# Patient Record
Sex: MALE | Race: WHITE | Employment: FULL TIME | URBAN - METROPOLITAN AREA
[De-identification: names, ages, dates, MRNs, and addresses within clinical notes are randomized per-mention and may not be internally consistent; named-entity substitution may affect disease eponyms.]

---

## 2021-10-21 ENCOUNTER — HOSPITAL ENCOUNTER (EMERGENCY)
Facility: CLINIC | Age: 56
Discharge: ANOTHER HOSPITAL | End: 2021-10-22
Attending: EMERGENCY MEDICINE | Admitting: EMERGENCY MEDICINE
Payer: COMMERCIAL

## 2021-10-21 ENCOUNTER — APPOINTMENT (OUTPATIENT)
Dept: CT IMAGING | Facility: CLINIC | Age: 56
End: 2021-10-21
Attending: EMERGENCY MEDICINE
Payer: COMMERCIAL

## 2021-10-21 VITALS
RESPIRATION RATE: 18 BRPM | OXYGEN SATURATION: 95 % | WEIGHT: 181.4 LBS | TEMPERATURE: 98.8 F | HEART RATE: 70 BPM | HEIGHT: 71 IN | SYSTOLIC BLOOD PRESSURE: 135 MMHG | BODY MASS INDEX: 25.4 KG/M2 | DIASTOLIC BLOOD PRESSURE: 77 MMHG

## 2021-10-21 DIAGNOSIS — I60.9 SAH (SUBARACHNOID HEMORRHAGE) (H): ICD-10-CM

## 2021-10-21 LAB
ANION GAP SERPL CALCULATED.3IONS-SCNC: 3 MMOL/L (ref 3–14)
BASOPHILS # BLD AUTO: 0 10E3/UL (ref 0–0.2)
BASOPHILS NFR BLD AUTO: 0 %
BUN SERPL-MCNC: 13 MG/DL (ref 7–30)
CALCIUM SERPL-MCNC: 9 MG/DL (ref 8.5–10.1)
CHLORIDE BLD-SCNC: 102 MMOL/L (ref 94–109)
CO2 SERPL-SCNC: 31 MMOL/L (ref 20–32)
CREAT BLD-MCNC: 1.2 MG/DL (ref 0.7–1.3)
CREAT SERPL-MCNC: 1.17 MG/DL (ref 0.66–1.25)
EOSINOPHIL # BLD AUTO: 0.1 10E3/UL (ref 0–0.7)
EOSINOPHIL NFR BLD AUTO: 1 %
ERYTHROCYTE [DISTWIDTH] IN BLOOD BY AUTOMATED COUNT: 12.5 % (ref 10–15)
GFR SERPL CREATININE-BSD FRML MDRD: 68 ML/MIN/1.73M2
GFR SERPL CREATININE-BSD FRML MDRD: >60 ML/MIN/1.73M2
GLUCOSE BLD-MCNC: 123 MG/DL (ref 70–99)
HCT VFR BLD AUTO: 42.2 % (ref 40–53)
HGB BLD-MCNC: 14.3 G/DL (ref 13.3–17.7)
HOLD SPECIMEN: NORMAL
HOLD SPECIMEN: NORMAL
IMM GRANULOCYTES # BLD: 0 10E3/UL
IMM GRANULOCYTES NFR BLD: 0 %
LYMPHOCYTES # BLD AUTO: 1.8 10E3/UL (ref 0.8–5.3)
LYMPHOCYTES NFR BLD AUTO: 20 %
MCH RBC QN AUTO: 31 PG (ref 26.5–33)
MCHC RBC AUTO-ENTMCNC: 33.9 G/DL (ref 31.5–36.5)
MCV RBC AUTO: 92 FL (ref 78–100)
MONOCYTES # BLD AUTO: 1.1 10E3/UL (ref 0–1.3)
MONOCYTES NFR BLD AUTO: 12 %
NEUTROPHILS # BLD AUTO: 6.1 10E3/UL (ref 1.6–8.3)
NEUTROPHILS NFR BLD AUTO: 67 %
NRBC # BLD AUTO: 0 10E3/UL
NRBC BLD AUTO-RTO: 0 /100
PLATELET # BLD AUTO: 295 10E3/UL (ref 150–450)
POTASSIUM BLD-SCNC: 4 MMOL/L (ref 3.4–5.3)
RADIOLOGIST FLAGS: ABNORMAL
RADIOLOGIST FLAGS: ABNORMAL
RBC # BLD AUTO: 4.61 10E6/UL (ref 4.4–5.9)
SARS-COV-2 RNA RESP QL NAA+PROBE: NEGATIVE
SODIUM SERPL-SCNC: 136 MMOL/L (ref 133–144)
WBC # BLD AUTO: 9.1 10E3/UL (ref 4–11)

## 2021-10-21 PROCEDURE — 96374 THER/PROPH/DIAG INJ IV PUSH: CPT | Mod: 59

## 2021-10-21 PROCEDURE — 99207 PR SERVICE NOT STAFFED W/SUPERV PROV: CPT | Performed by: STUDENT IN AN ORGANIZED HEALTH CARE EDUCATION/TRAINING PROGRAM

## 2021-10-21 PROCEDURE — 85025 COMPLETE CBC W/AUTO DIFF WBC: CPT | Performed by: EMERGENCY MEDICINE

## 2021-10-21 PROCEDURE — 82565 ASSAY OF CREATININE: CPT

## 2021-10-21 PROCEDURE — 250N000009 HC RX 250: Performed by: EMERGENCY MEDICINE

## 2021-10-21 PROCEDURE — 99285 EMERGENCY DEPT VISIT HI MDM: CPT | Mod: 25

## 2021-10-21 PROCEDURE — 70498 CT ANGIOGRAPHY NECK: CPT

## 2021-10-21 PROCEDURE — 250N000011 HC RX IP 250 OP 636: Performed by: EMERGENCY MEDICINE

## 2021-10-21 PROCEDURE — 36415 COLL VENOUS BLD VENIPUNCTURE: CPT | Performed by: EMERGENCY MEDICINE

## 2021-10-21 PROCEDURE — 80048 BASIC METABOLIC PNL TOTAL CA: CPT | Performed by: EMERGENCY MEDICINE

## 2021-10-21 PROCEDURE — 70450 CT HEAD/BRAIN W/O DYE: CPT | Mod: XS

## 2021-10-21 PROCEDURE — 250N000013 HC RX MED GY IP 250 OP 250 PS 637: Performed by: STUDENT IN AN ORGANIZED HEALTH CARE EDUCATION/TRAINING PROGRAM

## 2021-10-21 PROCEDURE — 87635 SARS-COV-2 COVID-19 AMP PRB: CPT | Performed by: EMERGENCY MEDICINE

## 2021-10-21 PROCEDURE — C9803 HOPD COVID-19 SPEC COLLECT: HCPCS

## 2021-10-21 RX ORDER — LABETALOL HYDROCHLORIDE 5 MG/ML
20 INJECTION, SOLUTION INTRAVENOUS ONCE
Status: COMPLETED | OUTPATIENT
Start: 2021-10-21 | End: 2021-10-21

## 2021-10-21 RX ORDER — FLUTICASONE PROPIONATE 50 MCG
1 SPRAY, SUSPENSION (ML) NASAL 2 TIMES DAILY
COMMUNITY

## 2021-10-21 RX ORDER — NIMODIPINE 30 MG/1
30 CAPSULE, LIQUID FILLED ORAL
Status: DISCONTINUED | OUTPATIENT
Start: 2021-10-21 | End: 2021-10-22 | Stop reason: HOSPADM

## 2021-10-21 RX ORDER — IOPAMIDOL 755 MG/ML
70 INJECTION, SOLUTION INTRAVASCULAR ONCE
Status: COMPLETED | OUTPATIENT
Start: 2021-10-21 | End: 2021-10-21

## 2021-10-21 RX ADMIN — LABETALOL HYDROCHLORIDE 20 MG: 5 INJECTION, SOLUTION INTRAVENOUS at 22:58

## 2021-10-21 RX ADMIN — IOPAMIDOL 70 ML: 755 INJECTION, SOLUTION INTRAVENOUS at 21:41

## 2021-10-21 RX ADMIN — NIMODIPINE 30 MG: 30 CAPSULE, LIQUID FILLED ORAL at 22:57

## 2021-10-21 RX ADMIN — SODIUM CHLORIDE 90 ML: 900 INJECTION INTRAVENOUS at 21:41

## 2021-10-21 ASSESSMENT — MIFFLIN-ST. JEOR: SCORE: 1664.96

## 2021-10-21 ASSESSMENT — ENCOUNTER SYMPTOMS
SHORTNESS OF BREATH: 0
FEVER: 0
NECK PAIN: 1
NUMBNESS: 0
HEADACHES: 1
WEAKNESS: 0
ABDOMINAL PAIN: 0

## 2021-10-22 ENCOUNTER — HOSPITAL ENCOUNTER (INPATIENT)
Facility: CLINIC | Age: 56
LOS: 4 days | Discharge: HOME | DRG: 065 | End: 2021-10-26
Attending: NEUROLOGICAL SURGERY | Admitting: NEUROLOGICAL SURGERY
Payer: COMMERCIAL

## 2021-10-22 ENCOUNTER — APPOINTMENT (OUTPATIENT)
Dept: MRI IMAGING | Facility: CLINIC | Age: 56
DRG: 065 | End: 2021-10-22
Attending: STUDENT IN AN ORGANIZED HEALTH CARE EDUCATION/TRAINING PROGRAM
Payer: COMMERCIAL

## 2021-10-22 ENCOUNTER — APPOINTMENT (OUTPATIENT)
Dept: ULTRASOUND IMAGING | Facility: CLINIC | Age: 56
DRG: 065 | End: 2021-10-22
Attending: STUDENT IN AN ORGANIZED HEALTH CARE EDUCATION/TRAINING PROGRAM
Payer: COMMERCIAL

## 2021-10-22 ENCOUNTER — APPOINTMENT (OUTPATIENT)
Dept: CARDIOLOGY | Facility: CLINIC | Age: 56
DRG: 065 | End: 2021-10-22
Attending: STUDENT IN AN ORGANIZED HEALTH CARE EDUCATION/TRAINING PROGRAM
Payer: COMMERCIAL

## 2021-10-22 ENCOUNTER — APPOINTMENT (OUTPATIENT)
Dept: INTERVENTIONAL RADIOLOGY/VASCULAR | Facility: CLINIC | Age: 56
DRG: 065 | End: 2021-10-22
Attending: NEUROLOGICAL SURGERY
Payer: COMMERCIAL

## 2021-10-22 DIAGNOSIS — I60.9 SAH (SUBARACHNOID HEMORRHAGE) (H): Primary | ICD-10-CM

## 2021-10-22 DIAGNOSIS — G25.81 RESTLESS LEGS SYNDROME (RLS): ICD-10-CM

## 2021-10-22 LAB
ANION GAP SERPL CALCULATED.3IONS-SCNC: 4 MMOL/L (ref 3–14)
ATRIAL RATE - MUSE: 69 BPM
BUN SERPL-MCNC: 12 MG/DL (ref 7–30)
CALCIUM SERPL-MCNC: 8.8 MG/DL (ref 8.5–10.1)
CHLORIDE BLD-SCNC: 104 MMOL/L (ref 94–109)
CO2 SERPL-SCNC: 31 MMOL/L (ref 20–32)
CREAT SERPL-MCNC: 1.23 MG/DL (ref 0.66–1.25)
DIASTOLIC BLOOD PRESSURE - MUSE: NORMAL MMHG
ERYTHROCYTE [DISTWIDTH] IN BLOOD BY AUTOMATED COUNT: 12.6 % (ref 10–15)
FERRITIN SERPL-MCNC: 40 NG/ML (ref 26–388)
GFR SERPL CREATININE-BSD FRML MDRD: 64 ML/MIN/1.73M2
GLUCOSE BLD-MCNC: 121 MG/DL (ref 70–99)
GLUCOSE BLDC GLUCOMTR-MCNC: 110 MG/DL (ref 70–99)
GLUCOSE BLDC GLUCOMTR-MCNC: 133 MG/DL (ref 70–99)
HCT VFR BLD AUTO: 39.6 % (ref 40–53)
HGB BLD-MCNC: 13.5 G/DL (ref 13.3–17.7)
INTERPRETATION ECG - MUSE: NORMAL
LVEF ECHO: NORMAL
MAGNESIUM SERPL-MCNC: 2.1 MG/DL (ref 1.6–2.3)
MCH RBC QN AUTO: 31.7 PG (ref 26.5–33)
MCHC RBC AUTO-ENTMCNC: 34.1 G/DL (ref 31.5–36.5)
MCV RBC AUTO: 93 FL (ref 78–100)
MRSA DNA SPEC QL NAA+PROBE: NEGATIVE
P AXIS - MUSE: 24 DEGREES
PHOSPHATE SERPL-MCNC: 2.9 MG/DL (ref 2.5–4.5)
PLATELET # BLD AUTO: 269 10E3/UL (ref 150–450)
POTASSIUM BLD-SCNC: 4.6 MMOL/L (ref 3.4–5.3)
PR INTERVAL - MUSE: 124 MS
QRS DURATION - MUSE: 84 MS
QT - MUSE: 378 MS
QTC - MUSE: 405 MS
R AXIS - MUSE: 66 DEGREES
RBC # BLD AUTO: 4.26 10E6/UL (ref 4.4–5.9)
SA TARGET DNA: POSITIVE
SODIUM SERPL-SCNC: 139 MMOL/L (ref 133–144)
SYSTOLIC BLOOD PRESSURE - MUSE: NORMAL MMHG
T AXIS - MUSE: 35 DEGREES
TROPONIN I SERPL-MCNC: <0.015 UG/L (ref 0–0.04)
VENTRICULAR RATE- MUSE: 69 BPM
WBC # BLD AUTO: 11.5 10E3/UL (ref 4–11)

## 2021-10-22 PROCEDURE — 250N000009 HC RX 250: Performed by: STUDENT IN AN ORGANIZED HEALTH CARE EDUCATION/TRAINING PROGRAM

## 2021-10-22 PROCEDURE — 255N000002 HC RX 255 OP 636: Performed by: NEUROLOGICAL SURGERY

## 2021-10-22 PROCEDURE — A9585 GADOBUTROL INJECTION: HCPCS | Performed by: PSYCHIATRY & NEUROLOGY

## 2021-10-22 PROCEDURE — 36224 PLACE CATH CAROTD ART: CPT | Mod: 50

## 2021-10-22 PROCEDURE — B41FZZZ FLUOROSCOPY OF RIGHT LOWER EXTREMITY ARTERIES: ICD-10-PCS | Performed by: RADIOLOGY

## 2021-10-22 PROCEDURE — 83735 ASSAY OF MAGNESIUM: CPT | Performed by: STUDENT IN AN ORGANIZED HEALTH CARE EDUCATION/TRAINING PROGRAM

## 2021-10-22 PROCEDURE — B31GZZZ FLUOROSCOPY OF BILATERAL VERTEBRAL ARTERIES: ICD-10-PCS | Performed by: RADIOLOGY

## 2021-10-22 PROCEDURE — 93010 ELECTROCARDIOGRAM REPORT: CPT | Performed by: INTERNAL MEDICINE

## 2021-10-22 PROCEDURE — 36226 PLACE CATH VERTEBRAL ART: CPT | Mod: 50

## 2021-10-22 PROCEDURE — 258N000003 HC RX IP 258 OP 636: Performed by: STUDENT IN AN ORGANIZED HEALTH CARE EDUCATION/TRAINING PROGRAM

## 2021-10-22 PROCEDURE — 93886 INTRACRANIAL COMPLETE STUDY: CPT

## 2021-10-22 PROCEDURE — 70553 MRI BRAIN STEM W/O & W/DYE: CPT

## 2021-10-22 PROCEDURE — 72156 MRI NECK SPINE W/O & W/DYE: CPT

## 2021-10-22 PROCEDURE — 272N000506 HC NEEDLE CR6

## 2021-10-22 PROCEDURE — 250N000013 HC RX MED GY IP 250 OP 250 PS 637: Performed by: NURSE PRACTITIONER

## 2021-10-22 PROCEDURE — C1887 CATHETER, GUIDING: HCPCS

## 2021-10-22 PROCEDURE — 250N000013 HC RX MED GY IP 250 OP 250 PS 637: Performed by: STUDENT IN AN ORGANIZED HEALTH CARE EDUCATION/TRAINING PROGRAM

## 2021-10-22 PROCEDURE — 70553 MRI BRAIN STEM W/O & W/DYE: CPT | Mod: 26 | Performed by: RADIOLOGY

## 2021-10-22 PROCEDURE — 255N000002 HC RX 255 OP 636: Performed by: PSYCHIATRY & NEUROLOGY

## 2021-10-22 PROCEDURE — 272N000280 HC DEVICE COMPRESSION CR5

## 2021-10-22 PROCEDURE — 250N000011 HC RX IP 250 OP 636: Performed by: STUDENT IN AN ORGANIZED HEALTH CARE EDUCATION/TRAINING PROGRAM

## 2021-10-22 PROCEDURE — 93306 TTE W/DOPPLER COMPLETE: CPT

## 2021-10-22 PROCEDURE — 87641 MR-STAPH DNA AMP PROBE: CPT | Performed by: STUDENT IN AN ORGANIZED HEALTH CARE EDUCATION/TRAINING PROGRAM

## 2021-10-22 PROCEDURE — 272N000566 HC SHEATH CR3

## 2021-10-22 PROCEDURE — B31RZZZ FLUOROSCOPY OF INTRACRANIAL ARTERIES: ICD-10-PCS | Performed by: RADIOLOGY

## 2021-10-22 PROCEDURE — 999N000128 HC STATISTIC PERIPHERAL IV START W/O US GUIDANCE

## 2021-10-22 PROCEDURE — 36224 PLACE CATH CAROTD ART: CPT | Mod: XS | Performed by: RADIOLOGY

## 2021-10-22 PROCEDURE — 200N000002 HC R&B ICU UMMC

## 2021-10-22 PROCEDURE — 99152 MOD SED SAME PHYS/QHP 5/>YRS: CPT

## 2021-10-22 PROCEDURE — 85027 COMPLETE CBC AUTOMATED: CPT | Performed by: STUDENT IN AN ORGANIZED HEALTH CARE EDUCATION/TRAINING PROGRAM

## 2021-10-22 PROCEDURE — 99291 CRITICAL CARE FIRST HOUR: CPT | Performed by: PSYCHIATRY & NEUROLOGY

## 2021-10-22 PROCEDURE — 93306 TTE W/DOPPLER COMPLETE: CPT | Mod: 26 | Performed by: INTERNAL MEDICINE

## 2021-10-22 PROCEDURE — B318ZZZ FLUOROSCOPY OF BILATERAL INTERNAL CAROTID ARTERIES: ICD-10-PCS | Performed by: RADIOLOGY

## 2021-10-22 PROCEDURE — C1760 CLOSURE DEV, VASC: HCPCS

## 2021-10-22 PROCEDURE — 999N000226 HC STATISTIC SLP IP EVAL DEFER

## 2021-10-22 PROCEDURE — 272N000302 HC DEVICE INFLATION CR5

## 2021-10-22 PROCEDURE — C1769 GUIDE WIRE: HCPCS

## 2021-10-22 PROCEDURE — 36226 PLACE CATH VERTEBRAL ART: CPT | Mod: RT | Performed by: RADIOLOGY

## 2021-10-22 PROCEDURE — 250N000011 HC RX IP 250 OP 636

## 2021-10-22 PROCEDURE — 93886 INTRACRANIAL COMPLETE STUDY: CPT | Mod: 26 | Performed by: RADIOLOGY

## 2021-10-22 PROCEDURE — 82728 ASSAY OF FERRITIN: CPT | Performed by: STUDENT IN AN ORGANIZED HEALTH CARE EDUCATION/TRAINING PROGRAM

## 2021-10-22 PROCEDURE — 272N000192 HC ACCESSORY CR2

## 2021-10-22 PROCEDURE — 36415 COLL VENOUS BLD VENIPUNCTURE: CPT | Performed by: STUDENT IN AN ORGANIZED HEALTH CARE EDUCATION/TRAINING PROGRAM

## 2021-10-22 PROCEDURE — 84100 ASSAY OF PHOSPHORUS: CPT | Performed by: STUDENT IN AN ORGANIZED HEALTH CARE EDUCATION/TRAINING PROGRAM

## 2021-10-22 PROCEDURE — 80048 BASIC METABOLIC PNL TOTAL CA: CPT | Performed by: STUDENT IN AN ORGANIZED HEALTH CARE EDUCATION/TRAINING PROGRAM

## 2021-10-22 PROCEDURE — 84484 ASSAY OF TROPONIN QUANT: CPT | Performed by: STUDENT IN AN ORGANIZED HEALTH CARE EDUCATION/TRAINING PROGRAM

## 2021-10-22 PROCEDURE — 72156 MRI NECK SPINE W/O & W/DYE: CPT | Mod: 26 | Performed by: RADIOLOGY

## 2021-10-22 PROCEDURE — 272N000570 HC SHEATH CR7

## 2021-10-22 PROCEDURE — 99152 MOD SED SAME PHYS/QHP 5/>YRS: CPT | Mod: GC | Performed by: RADIOLOGY

## 2021-10-22 PROCEDURE — 99153 MOD SED SAME PHYS/QHP EA: CPT

## 2021-10-22 RX ORDER — NALOXONE HYDROCHLORIDE 0.4 MG/ML
0.4 INJECTION, SOLUTION INTRAMUSCULAR; INTRAVENOUS; SUBCUTANEOUS
Status: DISCONTINUED | OUTPATIENT
Start: 2021-10-22 | End: 2021-10-26 | Stop reason: HOSPADM

## 2021-10-22 RX ORDER — HEPARIN SODIUM 200 [USP'U]/100ML
1 INJECTION, SOLUTION INTRAVENOUS CONTINUOUS PRN
Status: DISCONTINUED | OUTPATIENT
Start: 2021-10-22 | End: 2021-10-22 | Stop reason: HOSPADM

## 2021-10-22 RX ORDER — NIMODIPINE 30 MG/1
60 CAPSULE, LIQUID FILLED ORAL EVERY 4 HOURS
Status: DISCONTINUED | OUTPATIENT
Start: 2021-10-22 | End: 2021-10-23

## 2021-10-22 RX ORDER — LIDOCAINE 40 MG/G
CREAM TOPICAL
Status: DISCONTINUED | OUTPATIENT
Start: 2021-10-22 | End: 2021-10-22 | Stop reason: HOSPADM

## 2021-10-22 RX ORDER — NALOXONE HYDROCHLORIDE 0.4 MG/ML
0.2 INJECTION, SOLUTION INTRAMUSCULAR; INTRAVENOUS; SUBCUTANEOUS
Status: DISCONTINUED | OUTPATIENT
Start: 2021-10-22 | End: 2021-10-26 | Stop reason: HOSPADM

## 2021-10-22 RX ORDER — LABETALOL HYDROCHLORIDE 5 MG/ML
10 INJECTION, SOLUTION INTRAVENOUS EVERY 10 MIN PRN
Status: DISCONTINUED | OUTPATIENT
Start: 2021-10-22 | End: 2021-10-26 | Stop reason: HOSPADM

## 2021-10-22 RX ORDER — NALOXONE HYDROCHLORIDE 0.4 MG/ML
0.4 INJECTION, SOLUTION INTRAMUSCULAR; INTRAVENOUS; SUBCUTANEOUS
Status: DISCONTINUED | OUTPATIENT
Start: 2021-10-22 | End: 2021-10-22 | Stop reason: HOSPADM

## 2021-10-22 RX ORDER — GADOBUTROL 604.72 MG/ML
7.5 INJECTION INTRAVENOUS ONCE
Status: COMPLETED | OUTPATIENT
Start: 2021-10-22 | End: 2021-10-22

## 2021-10-22 RX ORDER — AMOXICILLIN 250 MG
1 CAPSULE ORAL 2 TIMES DAILY
Status: DISCONTINUED | OUTPATIENT
Start: 2021-10-22 | End: 2021-10-26 | Stop reason: HOSPADM

## 2021-10-22 RX ORDER — IODIXANOL 320 MG/ML
150 INJECTION, SOLUTION INTRAVASCULAR ONCE
Status: COMPLETED | OUTPATIENT
Start: 2021-10-22 | End: 2021-10-22

## 2021-10-22 RX ORDER — ACETAMINOPHEN 325 MG/1
650 TABLET ORAL EVERY 6 HOURS PRN
Status: DISCONTINUED | OUTPATIENT
Start: 2021-10-22 | End: 2021-10-22

## 2021-10-22 RX ORDER — HEPARIN SODIUM 1000 [USP'U]/ML
500-8000 INJECTION, SOLUTION INTRAVENOUS; SUBCUTANEOUS
Status: COMPLETED | OUTPATIENT
Start: 2021-10-22 | End: 2021-10-22

## 2021-10-22 RX ORDER — HYDRALAZINE HYDROCHLORIDE 20 MG/ML
10-20 INJECTION INTRAMUSCULAR; INTRAVENOUS
Status: DISCONTINUED | OUTPATIENT
Start: 2021-10-22 | End: 2021-10-26 | Stop reason: HOSPADM

## 2021-10-22 RX ORDER — FENTANYL CITRATE 50 UG/ML
25-50 INJECTION, SOLUTION INTRAMUSCULAR; INTRAVENOUS
Status: DISCONTINUED | OUTPATIENT
Start: 2021-10-22 | End: 2021-10-23

## 2021-10-22 RX ORDER — NALOXONE HYDROCHLORIDE 0.4 MG/ML
0.2 INJECTION, SOLUTION INTRAMUSCULAR; INTRAVENOUS; SUBCUTANEOUS
Status: DISCONTINUED | OUTPATIENT
Start: 2021-10-22 | End: 2021-10-22 | Stop reason: HOSPADM

## 2021-10-22 RX ORDER — SODIUM CHLORIDE 9 MG/ML
INJECTION, SOLUTION INTRAVENOUS CONTINUOUS
Status: DISCONTINUED | OUTPATIENT
Start: 2021-10-22 | End: 2021-10-22 | Stop reason: HOSPADM

## 2021-10-22 RX ORDER — ACETAMINOPHEN 500 MG
1000 TABLET ORAL EVERY 6 HOURS PRN
Status: DISCONTINUED | OUTPATIENT
Start: 2021-10-22 | End: 2021-10-26 | Stop reason: HOSPADM

## 2021-10-22 RX ORDER — POLYETHYLENE GLYCOL 3350 17 G/17G
17 POWDER, FOR SOLUTION ORAL DAILY
Status: DISCONTINUED | OUTPATIENT
Start: 2021-10-22 | End: 2021-10-26 | Stop reason: HOSPADM

## 2021-10-22 RX ORDER — SODIUM CHLORIDE 9 MG/ML
INJECTION, SOLUTION INTRAVENOUS CONTINUOUS
Status: DISCONTINUED | OUTPATIENT
Start: 2021-10-22 | End: 2021-10-22

## 2021-10-22 RX ORDER — GABAPENTIN 100 MG/1
100 CAPSULE ORAL AT BEDTIME
Status: DISCONTINUED | OUTPATIENT
Start: 2021-10-22 | End: 2021-10-26 | Stop reason: HOSPADM

## 2021-10-22 RX ORDER — FENTANYL CITRATE 50 UG/ML
25-50 INJECTION, SOLUTION INTRAMUSCULAR; INTRAVENOUS
Status: DISCONTINUED | OUTPATIENT
Start: 2021-10-22 | End: 2021-10-22

## 2021-10-22 RX ORDER — FENTANYL CITRATE 50 UG/ML
25-50 INJECTION, SOLUTION INTRAMUSCULAR; INTRAVENOUS EVERY 5 MIN PRN
Status: DISCONTINUED | OUTPATIENT
Start: 2021-10-22 | End: 2021-10-22 | Stop reason: HOSPADM

## 2021-10-22 RX ORDER — FLUTICASONE PROPIONATE 50 MCG
1 SPRAY, SUSPENSION (ML) NASAL DAILY
Status: DISCONTINUED | OUTPATIENT
Start: 2021-10-22 | End: 2021-10-23

## 2021-10-22 RX ORDER — FLUMAZENIL 0.1 MG/ML
0.2 INJECTION, SOLUTION INTRAVENOUS
Status: DISCONTINUED | OUTPATIENT
Start: 2021-10-22 | End: 2021-10-22 | Stop reason: HOSPADM

## 2021-10-22 RX ADMIN — NIMODIPINE 60 MG: 30 CAPSULE, LIQUID FILLED ORAL at 23:56

## 2021-10-22 RX ADMIN — LIDOCAINE HYDROCHLORIDE 9 ML: 10 INJECTION, SOLUTION EPIDURAL; INFILTRATION; INTRACAUDAL; PERINEURAL at 10:11

## 2021-10-22 RX ADMIN — NIMODIPINE 60 MG: 30 CAPSULE, LIQUID FILLED ORAL at 03:13

## 2021-10-22 RX ADMIN — ACETAMINOPHEN 1000 MG: 500 TABLET, FILM COATED ORAL at 19:51

## 2021-10-22 RX ADMIN — FLUTICASONE FUROATE AND VILANTEROL TRIFENATATE 1 PUFF: 200; 25 POWDER RESPIRATORY (INHALATION) at 19:57

## 2021-10-22 RX ADMIN — FENTANYL CITRATE 25 MCG: 50 INJECTION, SOLUTION INTRAMUSCULAR; INTRAVENOUS at 18:41

## 2021-10-22 RX ADMIN — SODIUM CHLORIDE: 9 INJECTION, SOLUTION INTRAVENOUS at 03:08

## 2021-10-22 RX ADMIN — IODIXANOL 75 ML: 320 INJECTION, SOLUTION INTRAVASCULAR at 10:59

## 2021-10-22 RX ADMIN — NIMODIPINE 60 MG: 30 CAPSULE, LIQUID FILLED ORAL at 13:05

## 2021-10-22 RX ADMIN — MIDAZOLAM 0.5 MG: 1 INJECTION INTRAMUSCULAR; INTRAVENOUS at 10:16

## 2021-10-22 RX ADMIN — HEPARIN SODIUM 4 BAG: 200 INJECTION, SOLUTION INTRAVENOUS at 10:08

## 2021-10-22 RX ADMIN — FENTANYL CITRATE 25 MCG: 50 INJECTION INTRAMUSCULAR; INTRAVENOUS at 10:34

## 2021-10-22 RX ADMIN — LEVETIRACETAM 750 MG: 100 INJECTION, SOLUTION INTRAVENOUS at 04:08

## 2021-10-22 RX ADMIN — GABAPENTIN 100 MG: 100 CAPSULE ORAL at 21:25

## 2021-10-22 RX ADMIN — FENTANYL CITRATE 25 MCG: 50 INJECTION INTRAMUSCULAR; INTRAVENOUS at 10:16

## 2021-10-22 RX ADMIN — MIDAZOLAM 0.5 MG: 1 INJECTION INTRAMUSCULAR; INTRAVENOUS at 09:51

## 2021-10-22 RX ADMIN — FENTANYL CITRATE 50 MCG: 50 INJECTION, SOLUTION INTRAMUSCULAR; INTRAVENOUS at 13:24

## 2021-10-22 RX ADMIN — LEVETIRACETAM 750 MG: 100 INJECTION, SOLUTION INTRAVENOUS at 14:59

## 2021-10-22 RX ADMIN — LABETALOL HYDROCHLORIDE 10 MG: 5 INJECTION, SOLUTION INTRAVENOUS at 04:06

## 2021-10-22 RX ADMIN — HYDRALAZINE HYDROCHLORIDE 20 MG: 20 INJECTION INTRAMUSCULAR; INTRAVENOUS at 12:32

## 2021-10-22 RX ADMIN — FENTANYL CITRATE 25 MCG: 50 INJECTION INTRAMUSCULAR; INTRAVENOUS at 09:51

## 2021-10-22 RX ADMIN — LABETALOL HYDROCHLORIDE 10 MG: 5 INJECTION, SOLUTION INTRAVENOUS at 13:05

## 2021-10-22 RX ADMIN — MIDAZOLAM 0.5 MG: 1 INJECTION INTRAMUSCULAR; INTRAVENOUS at 10:39

## 2021-10-22 RX ADMIN — NIMODIPINE 60 MG: 30 CAPSULE, LIQUID FILLED ORAL at 06:23

## 2021-10-22 RX ADMIN — NIMODIPINE 60 MG: 30 CAPSULE, LIQUID FILLED ORAL at 19:53

## 2021-10-22 RX ADMIN — ACETAMINOPHEN 650 MG: 325 TABLET, FILM COATED ORAL at 04:20

## 2021-10-22 RX ADMIN — FLUTICASONE PROPIONATE 1 SPRAY: 50 SPRAY, METERED NASAL at 19:58

## 2021-10-22 RX ADMIN — ACETAMINOPHEN 1000 MG: 500 TABLET, FILM COATED ORAL at 12:32

## 2021-10-22 RX ADMIN — HEPARIN SODIUM 2000 UNITS: 1000 INJECTION, SOLUTION INTRAVENOUS; SUBCUTANEOUS at 10:40

## 2021-10-22 RX ADMIN — GADOBUTROL 7.5 ML: 604.72 INJECTION INTRAVENOUS at 19:20

## 2021-10-22 ASSESSMENT — ACTIVITIES OF DAILY LIVING (ADL)
ADLS_ACUITY_SCORE: 3
DIFFICULTY_COMMUNICATING: NO
ADLS_ACUITY_SCORE: 3
ADLS_ACUITY_SCORE: 3
DOING_ERRANDS_INDEPENDENTLY_DIFFICULTY: NO
DEPENDENT_IADLS:: INDEPENDENT
ADLS_ACUITY_SCORE: 3
ADLS_ACUITY_SCORE: 3
HEARING_DIFFICULTY_OR_DEAF: NO
WEAR_GLASSES_OR_BLIND: NO
CONCENTRATING,_REMEMBERING_OR_MAKING_DECISIONS_DIFFICULTY: NO
ADLS_ACUITY_SCORE: 3
ADLS_ACUITY_SCORE: 3
TOILETING_ISSUES: NO
DRESSING/BATHING_DIFFICULTY: NO
ADLS_ACUITY_SCORE: 3
ADLS_ACUITY_SCORE: 3
WALKING_OR_CLIMBING_STAIRS_DIFFICULTY: NO
ADLS_ACUITY_SCORE: 3
PATIENT_/_FAMILY_COMMUNICATION_STYLE: SPOKEN LANGUAGE (ENGLISH OR BILINGUAL)
ADLS_ACUITY_SCORE: 3
FALL_HISTORY_WITHIN_LAST_SIX_MONTHS: NO
DIFFICULTY_EATING/SWALLOWING: NO

## 2021-10-22 ASSESSMENT — VISUAL ACUITY
OU: BASELINE
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
## 2021-10-22 NOTE — PLAN OF CARE
Major Shift Events:  Neuro exam intact.  Q2 Neuro checks.  Angio unremarkable.  MRI head/neck currently being done.  SBP goal now less than 140.  Two PRN's needed when previous goal of less than 120 was in place.  Room air.  Regular diet.  Voids in toilet.  Family and patient updated by bedside RN and NCC team.    Plan: Monitor neuro status for changes and BP for need of PRN's.    For vital signs and complete assessments, please see documentation flowsheets.

## 2021-10-22 NOTE — PROGRESS NOTES
Patient Name: Unruly Akhtar  Medical Record Number: [id number]  Today's Date: 10/22/2021    Procedure: Diagnostic Cerebral Angiogram   Proceduralist: Dr. Park,  Dr. Falcon, and Dr. Herrera  Pathology present: KARTIK    Procedure Start: 0954  Procedure end: 1055  Sedation medications administered:    Fentanyl 75mcg   Midazolam 1.5mg     Report given to: Vadim ESCOBAR  : KARTIK    Pre procedure pulses and neuro assessment charted in flowsheet    Other Notes: Pt arrived to IR room 3 from . Consent reviewed. Pt denies any questions or concerns regarding procedure. Pt positioned supine and monitored per protocol. Pt tolerated procedure without any noted complications. Pt transferred back to .    Right groin WDL, +CMS, distal pulses +2. Angioseal closure device deployed at 1055.  FLAT BEDREST FOR 2 HOURS UNTIL 1255.
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
## 2021-10-22 NOTE — PROGRESS NOTES
Admitted/transferred from: [facility]  Reason for admission/transfer: SAH  2 RN skin assessment: completed by Chayo LAWSON RN and Zeina LINARES RN  Result of skin assessment and interventions/actions: Skin intact  Height, weight, drug calc weight: Done  Patient belongings (see Flowsheet)  MDRO education added to care planN/A  ?
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
## 2021-10-22 NOTE — PLAN OF CARE
SLP consult received. Chart reviewed and case discussed with RN who reports the patient does not have any speech or swallow concerns, passed RN screen and is swallowing a regular diet/thin liquids without difficulty. Formal SLP eval not indicated so consult is being deferred at this time.
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
## 2021-10-22 NOTE — PLAN OF CARE
D/I: Patient admitted to unit 4A Surgical/Neuro ICU for SAH  /67   Pulse 56   Temp 98.5  F (36.9  C) (Oral)   Resp 14   Wt 78.3 kg (172 lb 9.9 oz)   SpO2 94%   BMI 24.08 kg/m    Neuro- Neurologically intact. 5/5 strength in all extremities. Up SBA. Reports baseline twitching/cramping in BLE.   CV- Sinus bradycardia/sinus rhythm. SBP goal <120, PRN Labetalol given x 1. Afebrile.   Respiratory- Room air. Lungs clear.  GI- NPO. Swallows pills without difficulty.  - Voids spontaneously.   Skin- No skin issues.  Gtts- NS @ 75.   Pain- Tylenol for headache.     See flowsheets for further interventions and assessments.   A: Stable  P: Continue to monitor pt closely. Notify MD of significant changes. Angiogram today.
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
## 2021-10-22 NOTE — PROCEDURES
[facility]     Endovascular Surgical Neuroradiology Post-Procedure Note    Pre-Procedure Diagnosis: CT negative SAH   Post-Procedure Diagnosis: Angio negative SAH     Procedure(s):   Diagnostic cervicocerebral angiography    Findings:  No cerebral aneurysms, AVM or dural AV Fistula identified     Plan: Continue Neuro critical care. Advice MRI Craniocervical junction     Primary Surgeon:  Dr. Viktor Park  Secondary Surgeon:  Not applicable  Secondary Surgeon Review:  None  Fellow:  Dr Falcon, Dr Herrera  Additional Assistants:     Prior to the start of the procedure and with procedural staff participation, I verbally confirmed: the patient s identity using two indicators, relevant allergies, that the procedure was appropriate and matched the consent or emergent situation, and that the correct equipment/implants were available. Immediately prior to starting the procedure I conducted the Time Out with the procedural staff and re-confirmed the patient s name, procedure, and site/side. (The Joint Commission universal protocol was followed.)  Yes    PRU value: Not applicable    Anesthesia:  Conscious Sedation  Medications:  Fentanyl 75mcg, 1% Lidocaine  Puncture site:  Right Femoral Artery    Fluoroscopy time (minutes):  22.6  Radiation dose (mGy):  842.9    Contrast amount (mL):  75     Estimated blood loss (mL): 15    Closure:  Device 6F Angioseal    Disposition:  Will be followed in hospital by the Neuro Critical Care/Stroke team.        Sedation Post-Procedure Summary    Sedatives: Midazolam (Versed) 1.5mg    Vital signs and pulse oximetry were monitored and remained stable throughout the procedure, and sedation was maintained until the procedure was complete.  The patient was monitored by staff until sedation discharge criteria were met.    Patient tolerance:  Patient tolerated the procedure well with no immediate complications.    Time of sedation in minutes:  60  minutes from beginning to end of physician one to one monitoring.    NAYELY Castro  Neuro IR Fellow   Pager: [phone]

## 2021-10-23 ENCOUNTER — APPOINTMENT (OUTPATIENT)
Dept: PHYSICAL THERAPY | Facility: CLINIC | Age: 56
DRG: 065 | End: 2021-10-23
Attending: STUDENT IN AN ORGANIZED HEALTH CARE EDUCATION/TRAINING PROGRAM
Payer: COMMERCIAL

## 2021-10-23 ENCOUNTER — APPOINTMENT (OUTPATIENT)
Dept: ULTRASOUND IMAGING | Facility: CLINIC | Age: 56
DRG: 065 | End: 2021-10-23
Attending: STUDENT IN AN ORGANIZED HEALTH CARE EDUCATION/TRAINING PROGRAM
Payer: COMMERCIAL

## 2021-10-23 LAB
ANION GAP SERPL CALCULATED.3IONS-SCNC: 7 MMOL/L (ref 3–14)
BUN SERPL-MCNC: 16 MG/DL (ref 7–30)
CALCIUM SERPL-MCNC: 8.5 MG/DL (ref 8.5–10.1)
CHLORIDE BLD-SCNC: 108 MMOL/L (ref 94–109)
CO2 SERPL-SCNC: 23 MMOL/L (ref 20–32)
CREAT SERPL-MCNC: 0.99 MG/DL (ref 0.66–1.25)
ERYTHROCYTE [DISTWIDTH] IN BLOOD BY AUTOMATED COUNT: 12.8 % (ref 10–15)
GFR SERPL CREATININE-BSD FRML MDRD: 84 ML/MIN/1.73M2
GLUCOSE BLD-MCNC: 124 MG/DL (ref 70–99)
HCT VFR BLD AUTO: 38.9 % (ref 40–53)
HGB BLD-MCNC: 13 G/DL (ref 13.3–17.7)
MAGNESIUM SERPL-MCNC: 2 MG/DL (ref 1.6–2.3)
MCH RBC QN AUTO: 30.8 PG (ref 26.5–33)
MCHC RBC AUTO-ENTMCNC: 33.4 G/DL (ref 31.5–36.5)
MCV RBC AUTO: 92 FL (ref 78–100)
PHOSPHATE SERPL-MCNC: 2.8 MG/DL (ref 2.5–4.5)
PLATELET # BLD AUTO: 256 10E3/UL (ref 150–450)
POTASSIUM BLD-SCNC: 3.8 MMOL/L (ref 3.4–5.3)
RBC # BLD AUTO: 4.22 10E6/UL (ref 4.4–5.9)
SODIUM SERPL-SCNC: 138 MMOL/L (ref 133–144)
WBC # BLD AUTO: 13.6 10E3/UL (ref 4–11)

## 2021-10-23 PROCEDURE — 97161 PT EVAL LOW COMPLEX 20 MIN: CPT | Mod: GP | Performed by: PHYSICAL THERAPIST

## 2021-10-23 PROCEDURE — 85027 COMPLETE CBC AUTOMATED: CPT | Performed by: STUDENT IN AN ORGANIZED HEALTH CARE EDUCATION/TRAINING PROGRAM

## 2021-10-23 PROCEDURE — 258N000003 HC RX IP 258 OP 636: Performed by: STUDENT IN AN ORGANIZED HEALTH CARE EDUCATION/TRAINING PROGRAM

## 2021-10-23 PROCEDURE — 93886 INTRACRANIAL COMPLETE STUDY: CPT | Mod: 26 | Performed by: STUDENT IN AN ORGANIZED HEALTH CARE EDUCATION/TRAINING PROGRAM

## 2021-10-23 PROCEDURE — 93886 INTRACRANIAL COMPLETE STUDY: CPT

## 2021-10-23 PROCEDURE — 36415 COLL VENOUS BLD VENIPUNCTURE: CPT | Performed by: STUDENT IN AN ORGANIZED HEALTH CARE EDUCATION/TRAINING PROGRAM

## 2021-10-23 PROCEDURE — 84100 ASSAY OF PHOSPHORUS: CPT | Performed by: STUDENT IN AN ORGANIZED HEALTH CARE EDUCATION/TRAINING PROGRAM

## 2021-10-23 PROCEDURE — 250N000013 HC RX MED GY IP 250 OP 250 PS 637: Performed by: NURSE PRACTITIONER

## 2021-10-23 PROCEDURE — 97530 THERAPEUTIC ACTIVITIES: CPT | Mod: GP | Performed by: PHYSICAL THERAPIST

## 2021-10-23 PROCEDURE — 250N000011 HC RX IP 250 OP 636: Performed by: NURSE PRACTITIONER

## 2021-10-23 PROCEDURE — 83735 ASSAY OF MAGNESIUM: CPT | Performed by: STUDENT IN AN ORGANIZED HEALTH CARE EDUCATION/TRAINING PROGRAM

## 2021-10-23 PROCEDURE — 250N000013 HC RX MED GY IP 250 OP 250 PS 637: Performed by: STUDENT IN AN ORGANIZED HEALTH CARE EDUCATION/TRAINING PROGRAM

## 2021-10-23 PROCEDURE — 80048 BASIC METABOLIC PNL TOTAL CA: CPT | Performed by: STUDENT IN AN ORGANIZED HEALTH CARE EDUCATION/TRAINING PROGRAM

## 2021-10-23 PROCEDURE — 250N000011 HC RX IP 250 OP 636: Performed by: STUDENT IN AN ORGANIZED HEALTH CARE EDUCATION/TRAINING PROGRAM

## 2021-10-23 PROCEDURE — 200N000002 HC R&B ICU UMMC

## 2021-10-23 PROCEDURE — 99291 CRITICAL CARE FIRST HOUR: CPT | Performed by: PSYCHIATRY & NEUROLOGY

## 2021-10-23 PROCEDURE — 97116 GAIT TRAINING THERAPY: CPT | Mod: GP | Performed by: PHYSICAL THERAPIST

## 2021-10-23 RX ORDER — FLUTICASONE PROPIONATE 50 MCG
1 SPRAY, SUSPENSION (ML) NASAL DAILY
Status: DISCONTINUED | OUTPATIENT
Start: 2021-10-23 | End: 2021-10-23

## 2021-10-23 RX ORDER — OXYCODONE HYDROCHLORIDE 5 MG/1
5 TABLET ORAL
Status: DISCONTINUED | OUTPATIENT
Start: 2021-10-23 | End: 2021-10-23

## 2021-10-23 RX ORDER — FLUTICASONE PROPIONATE 50 MCG
1 SPRAY, SUSPENSION (ML) NASAL 2 TIMES DAILY
Status: DISCONTINUED | OUTPATIENT
Start: 2021-10-23 | End: 2021-10-26 | Stop reason: HOSPADM

## 2021-10-23 RX ORDER — OXYCODONE HYDROCHLORIDE 5 MG/1
5 TABLET ORAL
Status: COMPLETED | OUTPATIENT
Start: 2021-10-22 | End: 2021-10-23

## 2021-10-23 RX ORDER — DEXAMETHASONE SODIUM PHOSPHATE 4 MG/ML
10 INJECTION, SOLUTION INTRA-ARTICULAR; INTRALESIONAL; INTRAMUSCULAR; INTRAVENOUS; SOFT TISSUE ONCE
Status: COMPLETED | OUTPATIENT
Start: 2021-10-23 | End: 2021-10-23

## 2021-10-23 RX ORDER — KETOROLAC TROMETHAMINE 15 MG/ML
30 INJECTION, SOLUTION INTRAMUSCULAR; INTRAVENOUS ONCE
Status: COMPLETED | OUTPATIENT
Start: 2021-10-23 | End: 2021-10-23

## 2021-10-23 RX ORDER — ASCORBIC ACID 100 %
POWDER (GRAM) MISCELLANEOUS
COMMUNITY

## 2021-10-23 RX ORDER — METOCLOPRAMIDE HYDROCHLORIDE 5 MG/ML
10 INJECTION INTRAMUSCULAR; INTRAVENOUS ONCE
Status: COMPLETED | OUTPATIENT
Start: 2021-10-23 | End: 2021-10-23

## 2021-10-23 RX ORDER — DIPHENHYDRAMINE HYDROCHLORIDE 50 MG/ML
50 INJECTION INTRAMUSCULAR; INTRAVENOUS ONCE
Status: COMPLETED | OUTPATIENT
Start: 2021-10-23 | End: 2021-10-23

## 2021-10-23 RX ORDER — LEVETIRACETAM 750 MG/1
750 TABLET ORAL 2 TIMES DAILY
Status: DISCONTINUED | OUTPATIENT
Start: 2021-10-23 | End: 2021-10-23

## 2021-10-23 RX ADMIN — HYDRALAZINE HYDROCHLORIDE 20 MG: 20 INJECTION INTRAMUSCULAR; INTRAVENOUS at 22:13

## 2021-10-23 RX ADMIN — HYDRALAZINE HYDROCHLORIDE 20 MG: 20 INJECTION INTRAMUSCULAR; INTRAVENOUS at 12:36

## 2021-10-23 RX ADMIN — LABETALOL HYDROCHLORIDE 10 MG: 5 INJECTION, SOLUTION INTRAVENOUS at 03:09

## 2021-10-23 RX ADMIN — OXYCODONE HYDROCHLORIDE 5 MG: 5 TABLET ORAL at 00:08

## 2021-10-23 RX ADMIN — HYDRALAZINE HYDROCHLORIDE 20 MG: 20 INJECTION INTRAMUSCULAR; INTRAVENOUS at 06:21

## 2021-10-23 RX ADMIN — ACETAMINOPHEN 1000 MG: 500 TABLET, FILM COATED ORAL at 02:05

## 2021-10-23 RX ADMIN — GABAPENTIN 100 MG: 100 CAPSULE ORAL at 21:53

## 2021-10-23 RX ADMIN — LABETALOL HYDROCHLORIDE 10 MG: 5 INJECTION, SOLUTION INTRAVENOUS at 04:07

## 2021-10-23 RX ADMIN — PROCHLORPERAZINE EDISYLATE 10 MG: 5 INJECTION INTRAMUSCULAR; INTRAVENOUS at 04:06

## 2021-10-23 RX ADMIN — LABETALOL HYDROCHLORIDE 10 MG: 5 INJECTION, SOLUTION INTRAVENOUS at 12:21

## 2021-10-23 RX ADMIN — DIPHENHYDRAMINE HYDROCHLORIDE 50 MG: 50 INJECTION INTRAMUSCULAR; INTRAVENOUS at 11:07

## 2021-10-23 RX ADMIN — DEXAMETHASONE SODIUM PHOSPHATE 10 MG: 4 INJECTION, SOLUTION INTRAMUSCULAR; INTRAVENOUS at 11:07

## 2021-10-23 RX ADMIN — Medication 1 SPRAY: at 20:18

## 2021-10-23 RX ADMIN — NIMODIPINE 60 MG: 30 CAPSULE, LIQUID FILLED ORAL at 04:05

## 2021-10-23 RX ADMIN — FLUTICASONE FUROATE AND VILANTEROL TRIFENATATE 1 PUFF: 200; 25 POWDER RESPIRATORY (INHALATION) at 08:01

## 2021-10-23 RX ADMIN — METOCLOPRAMIDE HYDROCHLORIDE 10 MG: 5 INJECTION INTRAMUSCULAR; INTRAVENOUS at 11:07

## 2021-10-23 RX ADMIN — LEVETIRACETAM 750 MG: 100 INJECTION, SOLUTION INTRAVENOUS at 03:09

## 2021-10-23 RX ADMIN — SODIUM CHLORIDE 1000 ML: 9 INJECTION, SOLUTION INTRAVENOUS at 04:06

## 2021-10-23 RX ADMIN — KETOROLAC TROMETHAMINE 30 MG: 15 INJECTION, SOLUTION INTRAMUSCULAR; INTRAVENOUS at 15:00

## 2021-10-23 RX ADMIN — NIMODIPINE 60 MG: 30 CAPSULE, LIQUID FILLED ORAL at 07:59

## 2021-10-23 ASSESSMENT — VISUAL ACUITY
OU: GLASSES
OU: BASELINE
OU: GLASSES
OU: BASELINE

## 2021-10-23 ASSESSMENT — ACTIVITIES OF DAILY LIVING (ADL)
ADLS_ACUITY_SCORE: 3

## 2021-10-23 NOTE — PROGRESS NOTES
Neurocritical Care Progress Note:    Physician Attestation   Unruly was seen and evaluated by me on 10/23/21. He was in critical condition as the result of SAH.  His condition is now Serious.     Significant changes in status since my last evaluation include worsening headache     I formulated and discussed my care plan with the patient s Advanced Practice Provider.   All physician orders and treatments were placed at my direction.    I have reviewed changes in critical data from the last 24 hours, including medications, laboratory results, vital signs and radiograph results.   Consults ongoing and ordered are none  I personally managed the antibiotic therapy, pain management, metabolic abnormalities, and nutritional status.     Key decisions made today included order TCD daily, discuss MRI findings, discontinue nimodipine and keppra. Headache management.  Procedures that will happen today are: none  The above plans and care have been discussed with Bedside Nurse, Patient and Patient's Family and all questions and concerns were addressed.    I spent a total of 35 minutes providing critical care services at the bedside, and on the critical care unit, evaluating the patient, directing care and reviewing laboratory values and radiologic reports for Unruly Akhtar excluding procedures.      Genevieve Tarango MD   Neurocritical Care  Date of Service (when I saw the patient): 10/23/21    Neuroscience Intensive Care Progress Note    REASON FOR CRITICAL CARE ADMISSION: Subarachnoid hemorrhage     Admitting Physician: Genevieve Tarango MD  Date of Service:  10/23/2021  Date of Admission:  10/22/2021  Hospital Day: 2    Problem List  1. Subarachnoid hemorrhage  2. Asthma    24 hour events:  HA's overnight    24 Hour Vital Signs Summary:  Temperatures:  Current - Temp: 98.7  F (37.1  C); Max - Temp  Av.5  F (36.9  C)  Min: 98  F (36.7  C)  Max: 99.1  F (37.3  C)  Respiration range: Resp  Av.5  Min: 8  Max:  25  Pulse range: Pulse  Av.1  Min: 56  Max: 90  Blood pressure range: Systolic (24hrs), Av , Min:85 , Max:149   ; Diastolic (24hrs), Av, Min:56, Max:83  BP - Mean:  [] 100  Pulse oximetry range: SpO2  Av.8 %  Min: 91 %  Max: 98 %    Respiratory monitoring:   Resp: 13  RA      Intake/Output Summary (Last 24 hours) at 10/23/2021 0825  Last data filed at 10/23/2021 0800  Gross per 24 hour   Intake 2325 ml   Output --   Net 2325 ml       Current Medications:    fluticasone  1 spray Both Nostrils Daily     fluticasone-vilanterol  1 puff Inhalation Daily     gabapentin  100 mg Oral At Bedtime     levETIRAcetam  750 mg Intravenous Q12H     niMODipine  60 mg Oral Q4H     polyethylene glycol  17 g Oral or Feeding Tube Daily     senna-docusate  1 tablet Oral or Feeding Tube BID       PRN Medications:  acetaminophen, hydrALAZINE, labetalol, naloxone **OR** naloxone **OR** naloxone **OR** naloxone, niCARdipine, prochlorperazine    Infusions:    niCARdipine         Allergies   Allergen Reactions     Ceftin [Cefuroxime]        Physical Examination:  Vitals: BP (!) 140/72   Pulse 75   Temp 98.7  F (37.1  C) (Oral)   Resp 13   Wt 78.4 kg (172 lb 13.5 oz)   SpO2 96%   BMI 24.11 kg/m    General: Adult male patient, lying in bed, NAD  HEENT: Normocephalic/Atraumatic, no icterus, Oral cavity/Oropharynx pink and moist  Cardiac: RRR  Chest: No SOB, pattern regular, unlabored, expansion symmetric, no retractions or use of accessory muscles  Abdomen: Soft, bowel sounds present  Extremities: Warm, no edema  Skin: No rash or lesion   Psych: Calm and cooperative  Neuro:  Mental status: Awake, alert, attentive, oriented to self, time, place, and circumstance. Language is fluent and coherent with intact comprehension of complex commands, naming and repetition.  Cranial nerves: VFF, PERRL, conjugate gaze, EOMI, pupils +3 round and reactive, facial sensation intact, face symmetric, shoulder shrug strong, tongue  midline, no dysarthria.   Motor: Normal bulk and tone. No abnormal movements. 5/5 strength in 4/4 extremities.   Sensory: Intact to light touch x 4 extremities  Coordination: FNF and HS without ataxia or dysmetria. Rapid alternating movements intact.   Gait: MIKHAIL, deferred.      Labs/Studies:  Recent Labs   Lab Test 10/23/21  0657 10/22/21  0753 10/22/21  0316 10/22/21  0029 10/21/21  2053 10/21/21  2051     --  139  --   --  136   POTASSIUM 3.8  --  4.6  --   --  4.0   CHLORIDE 108  --  104  --   --  102   CO2 23  --  31  --   --  31   ANIONGAP 7  --  4  --   --  3   * 110* 121*   < >  --  123*   BUN 16  --  12  --   --  13   CR 0.99  --  1.23  --  1.2 1.17   ANANTH 8.5  --  8.8  --   --  9.0   WBC 13.6*  --  11.5*  --   --  9.1   RBC 4.22*  --  4.26*  --   --  4.61   HGB 13.0*  --  13.5  --   --  14.3     --  269  --   --  295    < > = values in this interval not displayed.       Imaging:  All imaging personally reviewed.    Assessment/Plan  Unruly Akhtar is a 56 year old admitted on 10/22/2021 with a PMH for Asthma who presents as a transfer from [facility] for perimesencephalic subarachnoid hemorrhage.    Neuro  #Nonaneurysmal subarachnoid hemorrhage; likely perimesencephalic HH1 mF3; PBD4  -MRI head and c-spine completed 10/22- negative  -Nimodipine 60 mg q4h - will discontinue  -Keppra 750 mg q12h - will discontinue  -Neurochecks q2h  -daily TCD  -HOB > 30  -SBP goal < 140  -PT/OT    #Analgesics & sedation  -Tylenol 1000 mg q6h PRN  - Decadron 10 mg x1 for headache. Will also consider headache cocktail    #Restless legs  -Gabapentin 100 mg at bedtime    Resp  #Asthma  -Fluticasone-vilanterol 1 puff daily  -Continuous pulse ox  -Maintain O2 saturations greater than 92%    CV  -MARGUERITE  -Cardiac monitoring  -SBP goal < 140  -PRN labetalol and hydralazine    Renal  -MARGUERITE  -Daily BMP  -IV fluids: None  -Electrolyte replacement protocol     GI  -MARGUERITE  Diet: Regular diet  Last BM: Pre  admission   Bowel regimen: Scheduled Senna-docusate and Miralax    Endo  -MARGUERITE  -Follow glucose levels    Heme  #Leukocytosis: 11.5->13.6  #Anemia: 13.5->13.0  -Daily CBC  -Hgb goal >7, plt goal >50k  -Transfuse to meet Hgb and plt goals    ID  -MARGUERITE  -Daily CBC  -Follow temperature curve    Lines/tubes/drains: PIV x2  FEN: Regular diet  Ppx: DVT - SCD; GI - none  Code: Full  Dispo: ICU    The patient was seen and discussed with the NCC attending, Dr. Emelina YUAN, CNP  NeuroCritical Care Nurse Practitioner  Pager: [phone]  Ascom: *[phone] available [facility] [phone]

## 2021-10-23 NOTE — PLAN OF CARE
Major Shift Events:  Neurologically intact. Issue with headache overnight, rating as high as 9/10. Given Tylenol x 2, 5mg Oxycodone, Compazine and 1L bolus of NS with minimal relief. SR. Labetalol given x 2 and Hydralazine x 1 to maintain SBP <140. Room air. No BM, active bowel sounds, tolerating general diet well. Voids without issue.   Plan: Continue with plan of care. Q2 neuro checks. Transfer to floor when appropriate. Contact NCC with concerns/questions.   For vital signs and complete assessments, please see documentation flowsheets.

## 2021-10-23 NOTE — PLAN OF CARE
Major Shift Events:  Pain better controlled with Toradol.  Up in chair time 4hrs.  Pt walked unit with PT.  PRN X 2 (1 Labetelol, 1 Hydralazine).  Room air.    Plan: Continue to monitor neuro status.  For vital signs and complete assessments, please see documentation flowsheets.
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
## 2021-10-23 NOTE — PROGRESS NOTES
10/23/21 1600   Quick Adds   Type of Visit Initial PT Evaluation       Present no   Living Environment   People in home alone   Current Living Arrangements house   Home Accessibility stairs within home   Number of Stairs, Within Home, Primary greater than 10 stairs   Stair Railings, Within Home, Primary railings on both sides of stairs   Transportation Anticipated car, drives self   Living Environment Comments will need to arrange help if he cannot drive   Self-Care   Usual Activity Tolerance excellent   Current Activity Tolerance good   Regular Exercise Yes   Activity/Exercise Type biking   Exercise Amount/Frequency 3-5 times/wk   Equipment Currently Used at Home none   Activity/Exercise/Self-Care Comment IND with all functional mobility and ADLs, works full time corporate job in purchasing    Disability/Function   Hearing Difficulty or Deaf no   Wear Glasses or Blind no   Concentrating, Remembering or Making Decisions Difficulty no   Difficulty Communicating no   Difficulty Eating/Swallowing no   Walking or Climbing Stairs Difficulty no   Dressing/Bathing Difficulty no   Toileting issues no   Doing Errands Independently Difficulty (such as shopping) no   Fall history within last six months no   Change in Functional Status Since Onset of Current Illness/Injury no   General Information   Onset of Illness/Injury or Date of Surgery 10/22/21   Referring Physician Alida Santos MD   Patient/Family Therapy Goals Statement (PT) return home and return to work ASAP   Pertinent History of Current Problem (include personal factors and/or comorbidities that impact the POC) Unruly Akhtar is a 56 year old admitted on 10/22/2021 with a PMH for Asthma who presents as a transfer from [facility] for perimesencephalic subarachnoid hemorrhage.   Cognition   Orientation Status (Cognition) oriented x 4   Follows Commands (Cognition) WNL   Pain Assessment   Patient Currently in Pain Yes, see Vital  Sign flowsheet   Posture    Posture Not impaired   Range of Motion (ROM)   ROM Comment B UE/LE grossly WNL   Strength   Strength Comments B LE grossly  5/5   Bed Mobility   Comment (Bed Mobility) supine > sit independently   Transfers   Transfer Safety Comments sit > stand independently   Gait/Stairs (Locomotion)   Comment (Gait/Stairs) ambulated in room with SBA   Balance   Balance Comments maintained standing balance without UE support   Sensory Examination   Sensory Perception patient reports no sensory changes   Coordination   Coordination Comments finger > nose/heel > shin intact B   Muscle Tone   Muscle Tone no deficits were identified   Clinical Impression   Criteria for Skilled Therapeutic Intervention yes, treatment indicated   PT Diagnosis (PT) impaired functional mobility s/p SAH   Influenced by the following impairments pain, impaired balance, decreased activity tolerance   Functional limitations due to impairments impaired gait   Clinical Presentation Stable/Uncomplicated   Clinical Presentation Rationale clinical judgment   Clinical Decision Making (Complexity) low complexity   Therapy Frequency (PT) 6x/week   Predicted Duration of Therapy Intervention (days/wks) 1 week   Planned Therapy Interventions (PT) balance training;gait training;progressive activity/exercise   Risk & Benefits of therapy have been explained evaluation/treatment results reviewed;care plan/treatment goals reviewed   PT Discharge Planning    PT Discharge Recommendation (DC Rec) home   PT Rationale for DC Rec anticipate patient will be independent with functional mobility during expected length of acute stay   PT Brief overview of current status  transferred OOB with SBA.  ambulated 350' without AD and SBA.    Total Evaluation Time   Total Evaluation Time (Minutes) 9

## 2021-10-24 ENCOUNTER — APPOINTMENT (OUTPATIENT)
Dept: OCCUPATIONAL THERAPY | Facility: CLINIC | Age: 56
DRG: 065 | End: 2021-10-24
Attending: STUDENT IN AN ORGANIZED HEALTH CARE EDUCATION/TRAINING PROGRAM
Payer: COMMERCIAL

## 2021-10-24 ENCOUNTER — APPOINTMENT (OUTPATIENT)
Dept: ULTRASOUND IMAGING | Facility: CLINIC | Age: 56
DRG: 065 | End: 2021-10-24
Attending: STUDENT IN AN ORGANIZED HEALTH CARE EDUCATION/TRAINING PROGRAM
Payer: COMMERCIAL

## 2021-10-24 LAB
ANION GAP SERPL CALCULATED.3IONS-SCNC: 7 MMOL/L (ref 3–14)
BUN SERPL-MCNC: 25 MG/DL (ref 7–30)
CALCIUM SERPL-MCNC: 8.6 MG/DL (ref 8.5–10.1)
CHLORIDE BLD-SCNC: 101 MMOL/L (ref 94–109)
CO2 SERPL-SCNC: 25 MMOL/L (ref 20–32)
CREAT SERPL-MCNC: 0.94 MG/DL (ref 0.66–1.25)
ERYTHROCYTE [DISTWIDTH] IN BLOOD BY AUTOMATED COUNT: 12.8 % (ref 10–15)
GFR SERPL CREATININE-BSD FRML MDRD: 89 ML/MIN/1.73M2
GLUCOSE BLD-MCNC: 136 MG/DL (ref 70–99)
HCT VFR BLD AUTO: 39.6 % (ref 40–53)
HGB BLD-MCNC: 13.6 G/DL (ref 13.3–17.7)
MAGNESIUM SERPL-MCNC: 2 MG/DL (ref 1.6–2.3)
MCH RBC QN AUTO: 31.3 PG (ref 26.5–33)
MCHC RBC AUTO-ENTMCNC: 34.3 G/DL (ref 31.5–36.5)
MCV RBC AUTO: 91 FL (ref 78–100)
PHOSPHATE SERPL-MCNC: 3.3 MG/DL (ref 2.5–4.5)
PLATELET # BLD AUTO: 267 10E3/UL (ref 150–450)
POTASSIUM BLD-SCNC: 3.8 MMOL/L (ref 3.4–5.3)
RBC # BLD AUTO: 4.35 10E6/UL (ref 4.4–5.9)
SODIUM SERPL-SCNC: 133 MMOL/L (ref 133–144)
WBC # BLD AUTO: 19.1 10E3/UL (ref 4–11)

## 2021-10-24 PROCEDURE — 250N000011 HC RX IP 250 OP 636: Performed by: NURSE PRACTITIONER

## 2021-10-24 PROCEDURE — 83735 ASSAY OF MAGNESIUM: CPT | Performed by: STUDENT IN AN ORGANIZED HEALTH CARE EDUCATION/TRAINING PROGRAM

## 2021-10-24 PROCEDURE — 97129 THER IVNTJ 1ST 15 MIN: CPT | Mod: GO

## 2021-10-24 PROCEDURE — 97165 OT EVAL LOW COMPLEX 30 MIN: CPT | Mod: GO

## 2021-10-24 PROCEDURE — 250N000011 HC RX IP 250 OP 636: Performed by: PSYCHIATRY & NEUROLOGY

## 2021-10-24 PROCEDURE — 99291 CRITICAL CARE FIRST HOUR: CPT | Performed by: PSYCHIATRY & NEUROLOGY

## 2021-10-24 PROCEDURE — 97530 THERAPEUTIC ACTIVITIES: CPT | Mod: GO

## 2021-10-24 PROCEDURE — 84100 ASSAY OF PHOSPHORUS: CPT | Performed by: STUDENT IN AN ORGANIZED HEALTH CARE EDUCATION/TRAINING PROGRAM

## 2021-10-24 PROCEDURE — 36415 COLL VENOUS BLD VENIPUNCTURE: CPT | Performed by: STUDENT IN AN ORGANIZED HEALTH CARE EDUCATION/TRAINING PROGRAM

## 2021-10-24 PROCEDURE — 80048 BASIC METABOLIC PNL TOTAL CA: CPT | Performed by: STUDENT IN AN ORGANIZED HEALTH CARE EDUCATION/TRAINING PROGRAM

## 2021-10-24 PROCEDURE — 200N000002 HC R&B ICU UMMC

## 2021-10-24 PROCEDURE — 85014 HEMATOCRIT: CPT | Performed by: STUDENT IN AN ORGANIZED HEALTH CARE EDUCATION/TRAINING PROGRAM

## 2021-10-24 PROCEDURE — 250N000013 HC RX MED GY IP 250 OP 250 PS 637: Performed by: STUDENT IN AN ORGANIZED HEALTH CARE EDUCATION/TRAINING PROGRAM

## 2021-10-24 PROCEDURE — 93886 INTRACRANIAL COMPLETE STUDY: CPT

## 2021-10-24 PROCEDURE — 93886 INTRACRANIAL COMPLETE STUDY: CPT | Mod: 26 | Performed by: RADIOLOGY

## 2021-10-24 PROCEDURE — 250N000013 HC RX MED GY IP 250 OP 250 PS 637: Performed by: NURSE PRACTITIONER

## 2021-10-24 RX ORDER — GLUCOSAMINE SULFATE DIPOT CHLR 1000 MG
2 TABLET ORAL DAILY
COMMUNITY

## 2021-10-24 RX ORDER — ALBUTEROL SULFATE 90 UG/1
2 AEROSOL, METERED RESPIRATORY (INHALATION) EVERY 6 HOURS PRN
COMMUNITY

## 2021-10-24 RX ORDER — MAGNESIUM SULFATE HEPTAHYDRATE 40 MG/ML
2 INJECTION, SOLUTION INTRAVENOUS ONCE
Status: COMPLETED | OUTPATIENT
Start: 2021-10-24 | End: 2021-10-24

## 2021-10-24 RX ORDER — LANOLIN ALCOHOL/MO/W.PET/CERES
3 CREAM (GRAM) TOPICAL
Status: DISCONTINUED | OUTPATIENT
Start: 2021-10-24 | End: 2021-10-24

## 2021-10-24 RX ORDER — POTASSIUM CHLORIDE 7.45 MG/ML
10 INJECTION INTRAVENOUS
Status: DISPENSED | OUTPATIENT
Start: 2021-10-24 | End: 2021-10-24

## 2021-10-24 RX ADMIN — Medication 1 SPRAY: at 19:43

## 2021-10-24 RX ADMIN — FLUTICASONE FUROATE AND VILANTEROL TRIFENATATE 1 PUFF: 200; 25 POWDER RESPIRATORY (INHALATION) at 09:18

## 2021-10-24 RX ADMIN — ACETAMINOPHEN 1000 MG: 500 TABLET, FILM COATED ORAL at 21:18

## 2021-10-24 RX ADMIN — ACETAMINOPHEN 1000 MG: 500 TABLET, FILM COATED ORAL at 15:22

## 2021-10-24 RX ADMIN — ACETAMINOPHEN 1000 MG: 500 TABLET, FILM COATED ORAL at 02:09

## 2021-10-24 RX ADMIN — LABETALOL HYDROCHLORIDE 10 MG: 5 INJECTION, SOLUTION INTRAVENOUS at 09:21

## 2021-10-24 RX ADMIN — HYDRALAZINE HYDROCHLORIDE 10 MG: 20 INJECTION INTRAMUSCULAR; INTRAVENOUS at 10:21

## 2021-10-24 RX ADMIN — Medication 5 MG: at 19:44

## 2021-10-24 RX ADMIN — GABAPENTIN 100 MG: 100 CAPSULE ORAL at 21:18

## 2021-10-24 RX ADMIN — Medication 1 SPRAY: at 09:18

## 2021-10-24 RX ADMIN — MAGNESIUM SULFATE HEPTAHYDRATE 2 G: 40 INJECTION, SOLUTION INTRAVENOUS at 09:20

## 2021-10-24 RX ADMIN — Medication 3 MG: at 02:49

## 2021-10-24 RX ADMIN — LABETALOL HYDROCHLORIDE 10 MG: 5 INJECTION, SOLUTION INTRAVENOUS at 02:09

## 2021-10-24 RX ADMIN — POTASSIUM CHLORIDE 10 MEQ: 7.46 INJECTION, SOLUTION INTRAVENOUS at 10:21

## 2021-10-24 RX ADMIN — ACETAMINOPHEN 1000 MG: 500 TABLET, FILM COATED ORAL at 09:17

## 2021-10-24 ASSESSMENT — ACTIVITIES OF DAILY LIVING (ADL)
ADLS_ACUITY_SCORE: 3

## 2021-10-24 ASSESSMENT — VISUAL ACUITY
OU: GLASSES

## 2021-10-24 NOTE — PROGRESS NOTES
Physician Attestation     Unruly was seen and evaluated by me on 10/24/21. He was in critical condition as the result of SAH.  His condition is now Serious.     Significant changes in status since my last evaluation include improved headache, but not sleeping much at night     I formulated and discussed my care plan with the patient s Advanced Practice Provider.   All physician orders and treatments were placed at my direction.     I have reviewed changes in critical data from the last 24 hours, including medications, laboratory results, vital signs and radiograph results.   Consults ongoing and ordered are none  I personally managed the antibiotic therapy, pain management, metabolic abnormalities, and nutritional status.      Key decisions made today included order frequent neurological check for SAH management, headache management, SBP BP goal <160/105, will start melatonin at bedtime    Procedures that will happen today are: none  The above plans and care have been discussed with Bedside Nurse, Patient and Patient's Family and all questions and concerns were addressed.     I spent a total of 35 minutes providing critical care services at the bedside, and on the critical care unit, evaluating the patient, directing care and reviewing laboratory values and radiologic reports for Unruly Akhtar excluding procedures.        Genevieve Tarango MD   Neurocritical Care  Date of Service (when I saw the patient): 10/24/21    Neuroscience Intensive Care Progress Note    REASON FOR CRITICAL CARE ADMISSION: Subarachnoid hemorrhage     Admitting Physician: Genevieve Tarango MD  Date of Service:  10/24/2021  Date of Admission:  10/22/2021  Hospital Day: 3    Problem List  1. Subarachnoid hemorrhage  2. Asthma    24 hour events:  HA better after receiving Toradol.    24 Hour Vital Signs Summary:  Temperatures:  Current - Temp: 98.7  F (37.1  C); Max - Temp  Av.6  F (37  C)  Min: 98  F (36.7  C)  Max: 98.9  F (37.2  " C)  Respiration range: Resp  Av.1  Min: 11  Max: 16  Pulse range: Pulse  Av.6  Min: 61  Max: 82  Blood pressure range: Systolic (24hrs), Av , Min:117 , Max:150   ; Diastolic (24hrs), Av, Min:68, Max:126  BP - Mean:  [] 111  Pulse oximetry range: SpO2  Av %  Min: 94 %  Max: 96 %    Respiratory monitoring:   Resp: 11  RA      Intake/Output Summary (Last 24 hours) at 10/24/2021 0730  Last data filed at 10/23/2021 2000  Gross per 24 hour   Intake 1000 ml   Output --   Net 1000 ml       Current Medications:    fluticasone  1 spray Both Nostrils BID     fluticasone-vilanterol  1 puff Inhalation Daily     gabapentin  100 mg Oral At Bedtime     magnesium sulfate  2 g Intravenous Once     polyethylene glycol  17 g Oral or Feeding Tube Daily     potassium chloride  10 mEq Intravenous Q1H     senna-docusate  1 tablet Oral or Feeding Tube BID       PRN Medications:  acetaminophen, hydrALAZINE, labetalol, melatonin, naloxone **OR** naloxone **OR** naloxone **OR** naloxone, niCARdipine, prochlorperazine    Infusions:    niCARdipine         Allergies   Allergen Reactions     Ceftin [Cefuroxime] Angioedema     \"lip swelling\" per patient       Physical Examination:  Vitals: /85   Pulse 61   Temp 98.7  F (37.1  C) (Oral)   Resp 11   Wt 79.2 kg (174 lb 9.7 oz)   SpO2 96%   BMI 24.35 kg/m    General: Adult male patient, lying in bed, NAD  HEENT: Normocephalic/Atraumatic, no icterus, Oral cavity/Oropharynx pink and moist  Cardiac: RRR  Chest: No SOB, pattern regular, unlabored, expansion symmetric, no retractions or use of accessory muscles  Abdomen: Soft, bowel sounds present  Extremities: Warm, no edema  Skin: No rash or lesion   Psych: Calm and cooperative  Neuro:  Mental status: Awake, alert, attentive, oriented to self, time, place, and circumstance. Language is fluent and coherent with intact comprehension of complex commands, naming and repetition.  Cranial nerves: VFF, PERRL, conjugate " gaze, EOMI, pupils +3 round and reactive, facial sensation intact, face symmetric, shoulder shrug strong, tongue midline, no dysarthria.   Motor: Normal bulk and tone. No abnormal movements. 5/5 strength in 4/4 extremities.   Sensory: Intact to light touch x 4 extremities  Coordination: FNF and HS without ataxia or dysmetria. Rapid alternating movements intact.   Gait: MIKHAIL, deferred.    Labs/Studies:  Recent Labs   Lab Test 10/24/21  0508 10/23/21  0657 10/22/21  0753 10/22/21  0316 10/22/21  0029    138  --  139  --    POTASSIUM 3.8 3.8  --  4.6  --    CHLORIDE 101 108  --  104  --    CO2 25 23  --  31  --    ANIONGAP 7 7  --  4  --    * 124* 110* 121*   < >   BUN 25 16  --  12  --    CR 0.94 0.99  --  1.23  --    ANANTH 8.6 8.5  --  8.8  --    WBC 19.1* 13.6*  --  11.5*  --    RBC 4.35* 4.22*  --  4.26*  --    HGB 13.6 13.0*  --  13.5  --     256  --  269  --     < > = values in this interval not displayed.         Imaging:  All imaging personally reviewed.     Assessment/Plan  Unruly Akhtar is a 56 year old admitted on 10/22/2021 with a PMH for Asthma who presents as a transfer from [facility] for perimesencephalic subarachnoid hemorrhage.     Neuro  #Nonaneurysmal subarachnoid hemorrhage; likely perimesencephalic HH1 mF3; PBD5  -MRI head and c-spine completed 10/22- negative  -Neurochecks q2h during the day, q4h at night  -Daily TCD  -HOB > 30  -SBP goal < 160  -PT/OT: Home     #Analgesics & sedation  -Tylenol 1000 mg q6h PRN  - HA cocktail PRN (Toradol worked best)  - Will start melatonin     #Restless legs  -Gabapentin 100 mg at bedtime     Resp  #Asthma  -Fluticasone-vilanterol 1 puff daily  -Continuous pulse ox  -Maintain O2 saturations greater than 92%     CV  -MARGUERITE  -Cardiac monitoring  -SBP goal < 140  -PRN labetalol and hydralazine     Renal  -MARGUERITE  -Daily BMP  -IV fluids: None  -Electrolyte replacement protocol      GI  -MARGUERITE  Diet: Regular diet  Last BM: Pre admission    Bowel regimen: Scheduled Senna-docusate and Miralax- refusing at this time     Endo  -MARGUERITE  -Follow glucose levels     Heme  #Leukocytosis: 13.6->19.1  -Daily CBC  -Hgb goal >7, plt goal >50k  -Transfuse to meet Hgb and plt goals     ID  -Afebrile, worse leukocytosis- suspecting 2/2 recent decadron  -Daily CBC  -Follow temperature curve     Lines/tubes/drains: PIV x2  FEN: Regular diet  Ppx: DVT - SCD; GI - none  Code: Full  Dispo: ICU     The patient was seen and discussed with the [facility] attending, Dr. Emelina YUAN, CNP  NeuroCritical Care Nurse Practitioner  Pager: [phone]  Ascom: *[phone] available M- 0700 to 1700

## 2021-10-24 NOTE — PLAN OF CARE
N: Intact. AOX4, call light appropriate. Pupils equal and reactive. Moves all extremities with strength. Neck pain at rest and especially when turning side to side - adequately controlled with PRN tylenol + heat packs.   C: NSR. SBP < 160 (PRN hydralazine and labetalol this AM when goal < 140). Pulses palpable. Afebrile.   R: RA. Lungs clear. Scheduled inhaler and flonase  GI: Regular diet, tolerating well. LBM PTA, refusing bowel regimen - education done.   : Voiding in bathroom, unaccounted so I&O inaccurate. Mag and K replaced, recheck in AM  SKIN: Intact  ACCESS: PIV X2   ACTIVITY: SBA to bathroom and ambulating in hallway   SOCIAL: Brother at bedside, watching football.     PLAN: Monitor closely and notify MD of changes/concerns. Melatonin, promote sleep tonight.

## 2021-10-24 NOTE — PROGRESS NOTES
[facility], [place]   10/24/2021  Neurosurgery Progress Note:    Assessment:  56M, angio negative SAH in basal cisterns and perimesencephalic distribution w/o IVH. MR unremarkable.     Recommendations:  - Serial neuro exams  - Headache management  - Possible repeat angio  - Remainder of cares per primary team    -----------------------------------  Alida Santos MD  Neurosurgery Resident, PGY-2    Please contact neurosurgery resident on call with questions.    Dial * * [phone], enter [phone] when prompted.   -----------------------------------    Interval History: Neck stiffness improving; HA improving, continue management per [facility]    Objective:   Temp:  [98.6  F (37  C)-98.8  F (37.1  C)] 98.7  F (37.1  C)  Pulse:  [58-84] 67  Resp:  [11-17] 17  BP: (117-156)/(66-85) 123/75  SpO2:  [94 %-97 %] 94 %  I/O last 3 completed shifts:  In: 1470 [P.O.:1470]  Out: -     Gen: Appears comfortable, NAD  Neurologic:  - Alert & Oriented to person, place, time, and situation  - Follows commands briskly  - Speech fluent, spontaneous. No aphasia or dysarthria.  - No gaze preference. No apparent hemineglect.  - PERRL, EOMI  - Face symmetric  - No pronator drift     Del Tr Bi WE WF Gr   R 5 5 5 5 5 5   L 5 5 5 5 5 5    HF KE KF DF PF EHL   R 5 5 5 5 5 5   L 5 5 5 5 5 5     Sensation intact and symmetric to light touch throughout    LABS:  Recent Labs   Lab 10/24/21  0508 10/23/21  0657 10/22/21  0753 10/22/21  0316 10/22/21  0029    138  --  139  --    POTASSIUM 3.8 3.8  --  4.6  --    CHLORIDE 101 108  --  104  --    CO2 25 23  --  31  --    ANIONGAP 7 7  --  4  --    * 124* 110* 121*   < >   BUN 25 16  --  12  --    CR 0.94 0.99  --  1.23  --    ANANTH 8.6 8.5  --  8.8  --     < > = values in this interval not displayed.       Recent Labs   Lab 10/24/21  0508   WBC 19.1*   RBC 4.35*   HGB 13.6   HCT 39.6*   MCV 91   MCH 31.3   MCHC 34.3   RDW 12.8          IMAGING:  Recent Results (from  the past 24 hour(s))   US Transcranial Doppler Complete Port    Narrative    Ultrasound transcranial Doppler, 10/24/2021 9:50 AM.    Comparison: 10/23/2021.    History: SAH.    Findings:   The following mean arterial velocities are measured in cm/sec:    Maximum right MCA: 112 cm/s; previously 86 cm/s.  Right DARIAN: 102 cm/s; previously 73 cm/s.  Right ICA: 93 cm/s; previously 86 cm/s.  Right PCA: 63 cm/s; previously 24 cm/s.  Right extracranial ICA: 55 cm/s. ; previously 58 cm/s.    Maximum left MCA: 134 cm/s; previously 74 cm/s.  Left DARIAN: 29 cm/s; previously 68 cm/s.  Left ICA: 70 cm/s; previously 68 cm/s.  Left PCA: 76 cm/s; previously 50 cm/s.  Left extracranial ICA: 55 cm/s; previously 48 cm/s.    Overall increased velocities throughout.      Impression    Impression:   Mild left MCA vasospasm by velocity criteria, new since prior exam.    --------------------------------------------  Reference Values:     Middle Cerebral Artery:     Mean Flow velocity (MFV, cm/sec)  Mild: 120-150  Moderate: 150-200  Severe: >200    PSV (cm/sec)  Mild: 200-250  Moderate : 250-300  Severe: >300    Posterior cerebral artery:  PSV>120 cm/sec  MFV>85 cm/sec    Anterior cerebral artery:  PSV>120 cm/sec  MFV>80 cm/sec        Radiographics. 2013 Jan-Feb;33(1):E1-E14      I have personally reviewed the examination and initial interpretation  and I agree with the findings.    JOANA HUMPHREY MD         SYSTEM ID:  [id number]

## 2021-10-24 NOTE — PHARMACY-ADMISSION MEDICATION HISTORY
Admission Medication History Completed by Pharmacy    See [facility] Admission Navigator for allergy information, preferred outpatient pharmacy, prior to admission medications and immunization status.     Medication History Sources:     Patient    Changes made to PTA medication list (reason):    Added: All vitamins/supplements; albuterol PRN    Deleted: None    Changed: Fluticasone nasal spray from daily to twice daily per patient    Additional Information:    Patient affirmed only prescription medications are fluticasone nasal spray, Advair and albuterol PRN (never requires)     Prior to Admission medications    Medication Sig Last Dose Taking? Auth Provider   albuterol (PROAIR HFA/PROVENTIL HFA/VENTOLIN HFA) 108 (90 Base) MCG/ACT inhaler Inhale 2 puffs into the lungs every 6 hours as needed for shortness of breath / dyspnea or wheezing More than a month at Unknown time Yes Unknown, Entered By History   Ascorbic Acid POWD 1/2 teaspoon daily Past Week at Unknown time Yes Unknown, Entered By History   cholecalciferol (VITAMIN D3) 125 mcg (5000 units) capsule Take 125 mcg by mouth daily Past Week at Unknown time Yes Unknown, Entered By History   fluticasone (FLONASE) 50 MCG/ACT nasal spray Spray 1 spray into both nostrils 2 times daily  Past Week at Unknown time Yes Reported, Patient   fluticasone-salmeterol (ADVAIR) 500-50 MCG/DOSE inhaler Inhale 1 puff into the lungs every 12 hours  Past Week at Unknown time Yes Reported, Patient   Methylsulfonylmethane (MSM) 1000 MG TABS Take 2 tablets by mouth daily Past Week at Unknown time Yes Unknown, Entered By History   NONFORMULARY -Pure Encapsulations potassium supplement 2 tablet twice daily   -[employer] supplement 1 capsule twice daily  -[employer] Nutrition Endurolytes for workouts/cramps Past Week at Unknown time Yes Unknown, Entered By History   Saw Palmetto, Serenoa repens, (SAW PALMETTO PO) Take 1 tablet by mouth daily Past Week at Unknown time Yes  Unknown, Entered By History   ZINC SULFATE PO Take 1 tablet by mouth daily Past Week at Unknown time Yes Unknown, Entered By History       Date completed: 10/24/21    Medication history completed by: Nat Bob [facility]

## 2021-10-24 NOTE — PROGRESS NOTES
[facility], [place]   10/24/2021  Neurosurgery Progress Note:    Assessment:  56M, angio negative SAH w/o IVH. Angio negative. MR unremarkable.     Recommendations:  - Serial neuro exams  - Headache management  - Possible repeat angio  - Remainder of cares per primary team    -----------------------------------  Stephanie Watts MD  Neurosurgery Resident, PGY-3    Please contact neurosurgery resident on call with questions.    Dial * * [phone], enter [phone] when prompted.   -----------------------------------    Interval History: Headache, being managed by [facility]    Objective:   Temp:  [98  F (36.7  C)-98.9  F (37.2  C)] 98.7  F (37.1  C)  Pulse:  [61-84] 70  Resp:  [11-16] 12  BP: (117-150)/() 140/82  SpO2:  [94 %-97 %] 96 %  I/O last 3 completed shifts:  In: 1000 [P.O.:1000]  Out: -     Gen: Appears comfortable, NAD  Neurologic:  - Alert & Oriented to person, place, time, and situation  - Follows commands briskly  - Speech fluent, spontaneous. No aphasia or dysarthria.  - No gaze preference. No apparent hemineglect.  - PERRL, EOMI  - Face symmetric  - No pronator drift     Del Tr Bi WE WF Gr   R 5 5 5 5 5 5   L 5 5 5 5 5 5    HF KE KF DF PF EHL   R 5 5 5 5 5 5   L 5 5 5 5 5 5     Sensation intact and symmetric to light touch throughout    LABS:  Recent Labs   Lab 10/24/21  0508 10/23/21  0657 10/22/21  0753 10/22/21  0316 10/22/21  0029    138  --  139  --    POTASSIUM 3.8 3.8  --  4.6  --    CHLORIDE 101 108  --  104  --    CO2 25 23  --  31  --    ANIONGAP 7 7  --  4  --    * 124* 110* 121*   < >   BUN 25 16  --  12  --    CR 0.94 0.99  --  1.23  --    ANANTH 8.6 8.5  --  8.8  --     < > = values in this interval not displayed.       Recent Labs   Lab 10/24/21  0508   WBC 19.1*   RBC 4.35*   HGB 13.6   HCT 39.6*   MCV 91   MCH 31.3   MCHC 34.3   RDW 12.8          IMAGING:  Recent Results (from the past 24 hour(s))   US Transcranial Doppler Complete Port    Narrative     EXAMINATION: US TRANSCRANIAL DOPPLER COMPLETE PORTABLE, 10/23/2021  12:09 PM     COMPARISON: 10/22/2021    HISTORY: Subarachnoid hemorrhage    TECHNIQUE:  Grey-scale, color Doppler and spectral flow analysis.    Findings: The following mean arterial velocities are measured in  cm/sec:    Maximum right MCA: 86; previously 79  Right DARIAN: 73; previously 24   Right ICA: 86; previously 48  Right PCA: 24; previously 34    Maximum left MCA: 74; previously 65  Left DARIAN: 68; previously 20  Left ICA: 68; previously 51  Left PCA: 50; previously 22        Impression    Impression:   No evidence of vasospasm by velocity criteria.        --------------------------------------------  Reference Values:       Middle Cerebral Artery:     Mean Flow velocity (MFV, cm/sec)  Mild: 120-150  Moderate: 150-200  Severe: >200    PSV (cm/sec)  Mild: 200-250  Moderate : 250-300  Severe: >300    Posterior cerebral artery:  PSV>120 cm/sec  MFV>85 cm/sec    Anterior cerebral artery:  PSV>120 cm/sec  MFV>80 cm/sec        Radiographics. 2013 Jan-Feb;33(1):E1-E14            I have personally reviewed the examination and initial interpretation  and I agree with the findings.    CINDA VARMA MD         SYSTEM ID:  [id number]
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
## 2021-10-25 ENCOUNTER — APPOINTMENT (OUTPATIENT)
Dept: PHYSICAL THERAPY | Facility: CLINIC | Age: 56
DRG: 065 | End: 2021-10-25
Attending: NEUROLOGICAL SURGERY
Payer: COMMERCIAL

## 2021-10-25 ENCOUNTER — APPOINTMENT (OUTPATIENT)
Dept: ULTRASOUND IMAGING | Facility: CLINIC | Age: 56
DRG: 065 | End: 2021-10-25
Attending: STUDENT IN AN ORGANIZED HEALTH CARE EDUCATION/TRAINING PROGRAM
Payer: COMMERCIAL

## 2021-10-25 ENCOUNTER — APPOINTMENT (OUTPATIENT)
Dept: OCCUPATIONAL THERAPY | Facility: CLINIC | Age: 56
DRG: 065 | End: 2021-10-25
Attending: NEUROLOGICAL SURGERY
Payer: COMMERCIAL

## 2021-10-25 ENCOUNTER — APPOINTMENT (OUTPATIENT)
Dept: EDUCATION SERVICES | Facility: CLINIC | Age: 56
DRG: 065 | End: 2021-10-25
Attending: STUDENT IN AN ORGANIZED HEALTH CARE EDUCATION/TRAINING PROGRAM
Payer: COMMERCIAL

## 2021-10-25 LAB
ANION GAP SERPL CALCULATED.3IONS-SCNC: 9 MMOL/L (ref 3–14)
BUN SERPL-MCNC: 18 MG/DL (ref 7–30)
CALCIUM SERPL-MCNC: 8.2 MG/DL (ref 8.5–10.1)
CHLORIDE BLD-SCNC: 100 MMOL/L (ref 94–109)
CO2 SERPL-SCNC: 24 MMOL/L (ref 20–32)
CREAT SERPL-MCNC: 0.8 MG/DL (ref 0.66–1.25)
ERYTHROCYTE [DISTWIDTH] IN BLOOD BY AUTOMATED COUNT: 12.8 % (ref 10–15)
GFR SERPL CREATININE-BSD FRML MDRD: >90 ML/MIN/1.73M2
GLUCOSE BLD-MCNC: 128 MG/DL (ref 70–99)
GLUCOSE BLDC GLUCOMTR-MCNC: 185 MG/DL (ref 70–99)
HCT VFR BLD AUTO: 38.7 % (ref 40–53)
HGB BLD-MCNC: 13.2 G/DL (ref 13.3–17.7)
MAGNESIUM SERPL-MCNC: 2.1 MG/DL (ref 1.6–2.3)
MCH RBC QN AUTO: 31.3 PG (ref 26.5–33)
MCHC RBC AUTO-ENTMCNC: 34.1 G/DL (ref 31.5–36.5)
MCV RBC AUTO: 92 FL (ref 78–100)
PHOSPHATE SERPL-MCNC: 2.8 MG/DL (ref 2.5–4.5)
PLATELET # BLD AUTO: 262 10E3/UL (ref 150–450)
POTASSIUM BLD-SCNC: 4.2 MMOL/L (ref 3.4–5.3)
RBC # BLD AUTO: 4.22 10E6/UL (ref 4.4–5.9)
SODIUM SERPL-SCNC: 133 MMOL/L (ref 133–144)
WBC # BLD AUTO: 14.8 10E3/UL (ref 4–11)

## 2021-10-25 PROCEDURE — 85027 COMPLETE CBC AUTOMATED: CPT | Performed by: STUDENT IN AN ORGANIZED HEALTH CARE EDUCATION/TRAINING PROGRAM

## 2021-10-25 PROCEDURE — 84100 ASSAY OF PHOSPHORUS: CPT | Performed by: STUDENT IN AN ORGANIZED HEALTH CARE EDUCATION/TRAINING PROGRAM

## 2021-10-25 PROCEDURE — 36415 COLL VENOUS BLD VENIPUNCTURE: CPT | Performed by: STUDENT IN AN ORGANIZED HEALTH CARE EDUCATION/TRAINING PROGRAM

## 2021-10-25 PROCEDURE — 93886 INTRACRANIAL COMPLETE STUDY: CPT | Mod: 26 | Performed by: RADIOLOGY

## 2021-10-25 PROCEDURE — 80048 BASIC METABOLIC PNL TOTAL CA: CPT | Performed by: STUDENT IN AN ORGANIZED HEALTH CARE EDUCATION/TRAINING PROGRAM

## 2021-10-25 PROCEDURE — 200N000002 HC R&B ICU UMMC

## 2021-10-25 PROCEDURE — 97530 THERAPEUTIC ACTIVITIES: CPT | Mod: GP

## 2021-10-25 PROCEDURE — 97116 GAIT TRAINING THERAPY: CPT | Mod: GP

## 2021-10-25 PROCEDURE — 250N000013 HC RX MED GY IP 250 OP 250 PS 637: Performed by: NURSE PRACTITIONER

## 2021-10-25 PROCEDURE — 250N000013 HC RX MED GY IP 250 OP 250 PS 637: Performed by: STUDENT IN AN ORGANIZED HEALTH CARE EDUCATION/TRAINING PROGRAM

## 2021-10-25 PROCEDURE — 93886 INTRACRANIAL COMPLETE STUDY: CPT

## 2021-10-25 PROCEDURE — 83735 ASSAY OF MAGNESIUM: CPT | Performed by: STUDENT IN AN ORGANIZED HEALTH CARE EDUCATION/TRAINING PROGRAM

## 2021-10-25 PROCEDURE — 97535 SELF CARE MNGMENT TRAINING: CPT | Mod: GO

## 2021-10-25 PROCEDURE — 250N000011 HC RX IP 250 OP 636: Performed by: STUDENT IN AN ORGANIZED HEALTH CARE EDUCATION/TRAINING PROGRAM

## 2021-10-25 PROCEDURE — 99291 CRITICAL CARE FIRST HOUR: CPT | Performed by: PSYCHIATRY & NEUROLOGY

## 2021-10-25 RX ORDER — HEPARIN SODIUM 5000 [USP'U]/.5ML
5000 INJECTION, SOLUTION INTRAVENOUS; SUBCUTANEOUS 2 TIMES DAILY
Status: DISCONTINUED | OUTPATIENT
Start: 2021-10-25 | End: 2021-10-26 | Stop reason: HOSPADM

## 2021-10-25 RX ADMIN — ACETAMINOPHEN 1000 MG: 500 TABLET, FILM COATED ORAL at 22:18

## 2021-10-25 RX ADMIN — Medication 1 SPRAY: at 08:36

## 2021-10-25 RX ADMIN — POLYETHYLENE GLYCOL 3350 17 G: 17 POWDER, FOR SOLUTION ORAL at 20:48

## 2021-10-25 RX ADMIN — ACETAMINOPHEN 1000 MG: 500 TABLET, FILM COATED ORAL at 15:56

## 2021-10-25 RX ADMIN — DOCUSATE SODIUM 50 MG AND SENNOSIDES 8.6 MG 1 TABLET: 8.6; 5 TABLET, FILM COATED ORAL at 08:37

## 2021-10-25 RX ADMIN — HEPARIN SODIUM 5000 UNITS: 10000 INJECTION, SOLUTION INTRAVENOUS; SUBCUTANEOUS at 12:51

## 2021-10-25 RX ADMIN — POLYETHYLENE GLYCOL 3350 17 G: 17 POWDER, FOR SOLUTION ORAL at 08:37

## 2021-10-25 RX ADMIN — ACETAMINOPHEN 1000 MG: 500 TABLET, FILM COATED ORAL at 03:24

## 2021-10-25 RX ADMIN — Medication 1 SPRAY: at 20:49

## 2021-10-25 RX ADMIN — FLUTICASONE FUROATE AND VILANTEROL TRIFENATATE 1 PUFF: 200; 25 POWDER RESPIRATORY (INHALATION) at 08:36

## 2021-10-25 RX ADMIN — Medication 5 MG: at 20:51

## 2021-10-25 RX ADMIN — ACETAMINOPHEN 1000 MG: 500 TABLET, FILM COATED ORAL at 09:29

## 2021-10-25 RX ADMIN — GABAPENTIN 100 MG: 100 CAPSULE ORAL at 21:25

## 2021-10-25 RX ADMIN — HEPARIN SODIUM 5000 UNITS: 10000 INJECTION, SOLUTION INTRAVENOUS; SUBCUTANEOUS at 20:49

## 2021-10-25 ASSESSMENT — ACTIVITIES OF DAILY LIVING (ADL)
ADLS_ACUITY_SCORE: 3

## 2021-10-25 NOTE — CONSULTS
10/25/21 1432 Emmie Zhou, RN     Stroke Education Note     The following information has been reviewed with the patient:     1. Warning signs of stroke     2. Calling 911 if having warning signs of stroke     3. All modifiable risk factors: hypertension, CAD, atrial fib, diabetes, hypercholesterolemia, smoking, substance abuse, diet, physical inactivity, obesity, sleep apnea.        5. Follow-up plan for after discharg     In addition, the above information was given to the patient in writing as a part of the [facility] Stroke Class Handout.     Learner's response to risk factors / lifestyle modification education: Taking steps      Emmie Zhou, LUZ, RN

## 2021-10-25 NOTE — PLAN OF CARE
Major Shift Events:  Patient is neurologically intact and vitally stable. Q4h neuro checks NOC. Voids in bathroom with SBA; no BM during shift. Call light appropriate. Prn tylenol given x2 for headache and neck pain.   Plan: Continue to monitor neuro status and notify team with any changes or concerns.    For vital signs and complete assessments, please see documentation flowsheets.    Detail Level: Detailed

## 2021-10-25 NOTE — CONSULTS
10/25/21 1432 Emmie Zhou, RN     Stroke Education Note     The following information has been reviewed with the patient:     1. Warning signs of stroke     2. Calling 911 if having warning signs of stroke     3. All modifiable risk factors: hypertension, CAD, atrial fib, diabetes, hypercholesterolemia, smoking, substance abuse, diet, physical inactivity, obesity, sleep apnea.        5. Follow-up plan for after discharg     In addition, the above information was given to the patient in writing as a part of the [facility] Stroke Class Handout.     Learner's response to risk factors / lifestyle modification education: Taking steps      Emmie Zhou, LUZ, RN

## 2021-10-25 NOTE — PLAN OF CARE
Patient C/O of headache throughout the day, tylenol administered per order. No other significant events to report. No bowel movement despite initiating protocol. All VS WNL at this time. Patient visitor at bedside.

## 2021-10-25 NOTE — PLAN OF CARE
Occupational Therapy Discharge Summary    Reason for therapy discharge:    Discharged to home.    Progress towards therapy goal(s). See goals on Care Plan in [facility] electronic health record for goal details.  Goals met    Therapy recommendation(s):    No further therapy is recommended.OP OT pending any remaining cog deficits upon discharge.

## 2021-10-25 NOTE — PROGRESS NOTES
Physician Attestation     Unruly was seen and evaluated by me on 10/25/21. He was in critical condition as the result of SAH.  His condition remains serious.     Significant changes in status since my last evaluation include improved headache, and neck pain     I formulated and discussed my care plan with the patient s Advanced Practice Provider.   All physician orders and treatments were placed at my direction.     I have reviewed changes in critical data from the last 24 hours, including medications, laboratory results, vital signs and radiograph results.   I personally managed the antibiotic therapy, pain management, metabolic abnormalities, and nutritional status.      Key decisions made today included order frequent neurological check for SAH management, headache management, SBP BP goal <160/105, will start melatonin at bedtime, TCD with radiographic evidence of vasospasm, will discuss with NeuroIR team if repeat angiogram is needed     Procedures that will happen today are: none  The above plans and care have been discussed with Bedside Nurse, Patient and Patient's Family and all questions and concerns were addressed.     I spent a total of 35 minutes providing critical care services at the bedside, and on the critical care unit, evaluating the patient, directing care and reviewing laboratory values and radiologic reports for Unruly Akhtar excluding procedures.    Genevieve Tarango MD   Neurocritical Care  Date of Service (when I saw the patient): 10/25/21      [facility]  NeuroCritical Care Progress Note    Patient Name:  Unruly Akhtar  MRN:  [id number]    :  1965  Date of Service:  10/25/2021  Primary care provider:  Cristopher Kiser    24 HOUR EVENTS:  No events overnight.    ASSESSMENT/PLAN:  Unruly Akhtar is a 56 year old h/o asthma admitted 10/22/2021 with perimesencephalic SAH.     NEURO:  #Nonaneurysmal subarachnoid hemorrhage; likely  perimesencephalic HH1 mF3; PBD6  -MRI head and c-spine completed 10/22- negative  -Neurochecks q2h during the day, q4h at night  -Daily TCD  -Will discuss with NeuroIR if a repeat cerebral angiogram is needed  -HOB > 30  -SBP goal < 160  -PT/OT: Home     #Analgesics & sedation  -Tylenol 1000 mg q6h PRN  - HA cocktail PRN (Toradol worked best)  - melatonin     #Restless legs  -Gabapentin 100 mg at bedtime    CARDIO:  #MARGUERITE  -SBP < 160  -PRN's  -Tele      PULM:  #Asthma  -Fluticasone-vilanterol (BREO) 1 puff daily  -Continuous pulse ox  -O2>92%      GI:  #MARGUERITE  -Regular Diet  - Bowel regimen: senna, miralax  - Last BM: PTA  - GI ppx: none      RENAL:  #Hypocalcemia-8.2  -electrolyte replacement protocol  -Daily BMP      ENDO:  #MARGUERITE  - Follow glucose levels    HEME:  #MARGUERITE  - Daily CBC    ID:  #Afebrile  #Leukocytosis-14.8  -follow fever curve  -Daily CBC      Checklist:  FEN: None. Replacement, Regular diet  LDA: PIV  PPx:   - DVT: SCD's, hep subcutaneous   - GI: None  Code: Full    Dispo: ICU      Patient discussed with Attending Dr. Tarango .    Gen Kaplan DNP  Ascom: [id number]  10/25/2021      ______________________________    24 HOUR VITAL SIGNS SUMMARY:   Temperatures:  Current - Temp: 98.2  F (36.8  C); Max - Temp  Av.5  F (36.9  C)  Min: 98.2  F (36.8  C)  Max: 98.8  F (37.1  C)  Respiration range: Resp  Av.1  Min: 12  Max: 17  Pulse range: Pulse  Av  Min: 57  Max: 86  Blood pressure range: Systolic (24hrs), Av , Min:123 , Max:156   ; Diastolic (24hrs), Av, Min:66, Max:85    Pulse oximetry range: SpO2  Av.5 %  Min: 94 %  Max: 97 %    VENTILATOR SETTINGS:  Resp: 13        Intake/Output Summary (Last 24 hours) at 10/25/2021 0655  Last data filed at 10/24/2021 2000  Gross per 24 hour   Intake 920 ml   Output --   Net 920 ml       BP - Mean:  [] 114    PHYSICAL EXAMINATION:   BP (!) 143/78   Pulse 75   Temp 98.2  F (36.8  C) (Oral)   Resp 13   Wt 79.2 kg (174 lb 9.7 oz)   SpO2 95%  "  BMI 24.35 kg/m        General: Awake and alert, Lying in bed, NAD, cooperative  HEENT: Normocephalic, atraumatic, no epistaxis, no oral lesions, MMM  Resp: CTAB, no increased work of breathing  Cardio: RRR  Abdomen: +BS, Soft, non-distended, non-tender  Extremities: Warm and well perfused, peripheral pulses present, no edema  Skin: Not jaundiced, no rash, no ecchymoses  Psych: Normal mood and affect    Neuro:  Mental status: Awake, alert, attentive; oriented to P/P/T/S  Speech: Fluent, comprehension and repetition intact; No dysarthria  Cranial nerves: Visual fields intact, Eyes conjugate, PERRLA, EOMI w/ normal and smooth pursuit, face expression symmetric, facial sensation intact and symmetric, hearing intact to conversation, shoulder shrug strong, palate rise symmetric, tongue/uvula midline.  Motor: Tone normal. 5/5 strength in x4E. No atrophy or twitches. No Pronator drift present. Finger tapping symmetric. Rolling hands normal rate and coordination  Sensory: Intact to LT, PP x4E; No lateral extinction   Coordination: FNF intact bilaterally, no dysmetria; Normal heel-shin test bilaterally  Gait: Normal width, stride length, turn, and arm swing. Station normal. Tandem walk intact.      CURRENT MEDICATIONS:    fluticasone  1 spray Both Nostrils BID     fluticasone-vilanterol  1 puff Inhalation Daily     gabapentin  100 mg Oral At Bedtime     melatonin  5 mg Oral QPM     polyethylene glycol  17 g Oral or Feeding Tube Daily     senna-docusate  1 tablet Oral or Feeding Tube BID       PRN MEDICATIONS:  acetaminophen, hydrALAZINE, labetalol, naloxone **OR** naloxone **OR** naloxone **OR** naloxone, prochlorperazine    INFUSIONS:      ALLERGIES:  Allergies   Allergen Reactions     Ceftin [Cefuroxime] Angioedema     \"lip swelling\" per patient         LABS/STUDIES:  Recent Labs   Lab Test 10/25/21  0420 10/24/21  0508 10/23/21  0657    133 138   POTASSIUM 4.2 3.8 3.8   CHLORIDE 100 101 108   CO2 24 25 23 "   ANIONGAP 9 7 7   * 136* 124*   BUN 18 25 16   CR 0.80 0.94 0.99   ANANTH 8.2* 8.6 8.5   WBC 14.8* 19.1* 13.6*   RBC 4.22* 4.35* 4.22*   HGB 13.2* 13.6 13.0*    267 256       No results for input(s): INR, PTT in the last 83324 hours.    No lab results found in last 7 days.      I have personally reviewed all labs and imaging for this patient.

## 2021-10-25 NOTE — PLAN OF CARE
Physical Therapy Discharge Summary    Reason for therapy discharge:    Discharged to home.    Progress towards therapy goal(s). See goals on Care Plan in [facility] electronic health record for goal details.  Goals met    Therapy recommendation(s):    No further therapy is recommended.

## 2021-10-26 ENCOUNTER — APPOINTMENT (OUTPATIENT)
Dept: CT IMAGING | Facility: CLINIC | Age: 56
DRG: 065 | End: 2021-10-26
Attending: STUDENT IN AN ORGANIZED HEALTH CARE EDUCATION/TRAINING PROGRAM
Payer: COMMERCIAL

## 2021-10-26 ENCOUNTER — APPOINTMENT (OUTPATIENT)
Dept: ULTRASOUND IMAGING | Facility: CLINIC | Age: 56
DRG: 065 | End: 2021-10-26
Attending: STUDENT IN AN ORGANIZED HEALTH CARE EDUCATION/TRAINING PROGRAM
Payer: COMMERCIAL

## 2021-10-26 VITALS
OXYGEN SATURATION: 94 % | RESPIRATION RATE: 16 BRPM | HEART RATE: 72 BPM | WEIGHT: 174.6 LBS | TEMPERATURE: 98.1 F | DIASTOLIC BLOOD PRESSURE: 94 MMHG | BODY MASS INDEX: 24.35 KG/M2 | SYSTOLIC BLOOD PRESSURE: 146 MMHG

## 2021-10-26 LAB
ANION GAP SERPL CALCULATED.3IONS-SCNC: 6 MMOL/L (ref 3–14)
BUN SERPL-MCNC: 20 MG/DL (ref 7–30)
CALCIUM SERPL-MCNC: 8.5 MG/DL (ref 8.5–10.1)
CHLORIDE BLD-SCNC: 102 MMOL/L (ref 94–109)
CO2 SERPL-SCNC: 26 MMOL/L (ref 20–32)
CREAT SERPL-MCNC: 0.81 MG/DL (ref 0.66–1.25)
ERYTHROCYTE [DISTWIDTH] IN BLOOD BY AUTOMATED COUNT: 12.5 % (ref 10–15)
GFR SERPL CREATININE-BSD FRML MDRD: >90 ML/MIN/1.73M2
GLUCOSE BLD-MCNC: 121 MG/DL (ref 70–99)
GLUCOSE BLDC GLUCOMTR-MCNC: 120 MG/DL (ref 70–99)
GLUCOSE BLDC GLUCOMTR-MCNC: 132 MG/DL (ref 70–99)
HCT VFR BLD AUTO: 38.3 % (ref 40–53)
HGB BLD-MCNC: 13 G/DL (ref 13.3–17.7)
MAGNESIUM SERPL-MCNC: 2.1 MG/DL (ref 1.6–2.3)
MCH RBC QN AUTO: 31.4 PG (ref 26.5–33)
MCHC RBC AUTO-ENTMCNC: 33.9 G/DL (ref 31.5–36.5)
MCV RBC AUTO: 93 FL (ref 78–100)
PHOSPHATE SERPL-MCNC: 2.8 MG/DL (ref 2.5–4.5)
PLATELET # BLD AUTO: 275 10E3/UL (ref 150–450)
POTASSIUM BLD-SCNC: 4.1 MMOL/L (ref 3.4–5.3)
RBC # BLD AUTO: 4.14 10E6/UL (ref 4.4–5.9)
SODIUM SERPL-SCNC: 134 MMOL/L (ref 133–144)
WBC # BLD AUTO: 12.7 10E3/UL (ref 4–11)

## 2021-10-26 PROCEDURE — 0042T CT HEAD PERFUSION WITH CONTRAST: CPT | Mod: GC | Performed by: RADIOLOGY

## 2021-10-26 PROCEDURE — 250N000013 HC RX MED GY IP 250 OP 250 PS 637: Performed by: NURSE PRACTITIONER

## 2021-10-26 PROCEDURE — 70496 CT ANGIOGRAPHY HEAD: CPT

## 2021-10-26 PROCEDURE — 250N000011 HC RX IP 250 OP 636: Performed by: STUDENT IN AN ORGANIZED HEALTH CARE EDUCATION/TRAINING PROGRAM

## 2021-10-26 PROCEDURE — 93886 INTRACRANIAL COMPLETE STUDY: CPT

## 2021-10-26 PROCEDURE — 70498 CT ANGIOGRAPHY NECK: CPT | Performed by: RADIOLOGY

## 2021-10-26 PROCEDURE — 250N000013 HC RX MED GY IP 250 OP 250 PS 637: Performed by: PSYCHIATRY & NEUROLOGY

## 2021-10-26 PROCEDURE — 83735 ASSAY OF MAGNESIUM: CPT | Performed by: STUDENT IN AN ORGANIZED HEALTH CARE EDUCATION/TRAINING PROGRAM

## 2021-10-26 PROCEDURE — 99238 HOSP IP/OBS DSCHRG MGMT 30/<: CPT | Performed by: PSYCHIATRY & NEUROLOGY

## 2021-10-26 PROCEDURE — 36415 COLL VENOUS BLD VENIPUNCTURE: CPT | Performed by: STUDENT IN AN ORGANIZED HEALTH CARE EDUCATION/TRAINING PROGRAM

## 2021-10-26 PROCEDURE — 2894A CTA  HEAD NECK WITH CONTRAST: CPT | Mod: 26 | Performed by: RADIOLOGY

## 2021-10-26 PROCEDURE — 80048 BASIC METABOLIC PNL TOTAL CA: CPT | Performed by: STUDENT IN AN ORGANIZED HEALTH CARE EDUCATION/TRAINING PROGRAM

## 2021-10-26 PROCEDURE — 70496 CT ANGIOGRAPHY HEAD: CPT | Performed by: RADIOLOGY

## 2021-10-26 PROCEDURE — 0042T CT HEAD PERFUSION WITH CONTRAST: CPT

## 2021-10-26 PROCEDURE — 250N000011 HC RX IP 250 OP 636: Performed by: PSYCHIATRY & NEUROLOGY

## 2021-10-26 PROCEDURE — 84100 ASSAY OF PHOSPHORUS: CPT | Performed by: STUDENT IN AN ORGANIZED HEALTH CARE EDUCATION/TRAINING PROGRAM

## 2021-10-26 PROCEDURE — 93886 INTRACRANIAL COMPLETE STUDY: CPT | Mod: 26 | Performed by: STUDENT IN AN ORGANIZED HEALTH CARE EDUCATION/TRAINING PROGRAM

## 2021-10-26 PROCEDURE — 85027 COMPLETE CBC AUTOMATED: CPT | Performed by: STUDENT IN AN ORGANIZED HEALTH CARE EDUCATION/TRAINING PROGRAM

## 2021-10-26 PROCEDURE — 70450 CT HEAD/BRAIN W/O DYE: CPT

## 2021-10-26 RX ORDER — GABAPENTIN 100 MG/1
100 CAPSULE ORAL AT BEDTIME
Qty: 30 CAPSULE | Refills: 2 | Status: SHIPPED | OUTPATIENT
Start: 2021-10-26

## 2021-10-26 RX ORDER — IBUPROFEN 400 MG/1
800 TABLET, FILM COATED ORAL EVERY 6 HOURS PRN
Status: DISCONTINUED | OUTPATIENT
Start: 2021-10-26 | End: 2021-10-26 | Stop reason: HOSPADM

## 2021-10-26 RX ORDER — IOPAMIDOL 755 MG/ML
115 INJECTION, SOLUTION INTRAVASCULAR ONCE
Status: COMPLETED | OUTPATIENT
Start: 2021-10-26 | End: 2021-10-26

## 2021-10-26 RX ORDER — IBUPROFEN 800 MG/1
800 TABLET, FILM COATED ORAL EVERY 6 HOURS PRN
Qty: 50 TABLET | Refills: 0 | Status: SHIPPED | OUTPATIENT
Start: 2021-10-26

## 2021-10-26 RX ADMIN — FLUTICASONE FUROATE AND VILANTEROL TRIFENATATE 1 PUFF: 200; 25 POWDER RESPIRATORY (INHALATION) at 08:25

## 2021-10-26 RX ADMIN — IBUPROFEN 800 MG: 400 TABLET, FILM COATED ORAL at 09:35

## 2021-10-26 RX ADMIN — HEPARIN SODIUM 5000 UNITS: 10000 INJECTION, SOLUTION INTRAVENOUS; SUBCUTANEOUS at 08:26

## 2021-10-26 RX ADMIN — Medication 1 SPRAY: at 08:25

## 2021-10-26 RX ADMIN — ACETAMINOPHEN 1000 MG: 500 TABLET, FILM COATED ORAL at 04:20

## 2021-10-26 RX ADMIN — IOPAMIDOL 115 ML: 755 INJECTION, SOLUTION INTRAVENOUS at 11:16

## 2021-10-26 ASSESSMENT — ACTIVITIES OF DAILY LIVING (ADL)
ADLS_ACUITY_SCORE: 3

## 2021-10-26 ASSESSMENT — VISUAL ACUITY
OU: NORMAL ACUITY

## 2021-10-26 NOTE — PROGRESS NOTES
Major Shift Events:  AAOx4. Good strength. Q2 neuro am/Q4 pm. Tylenol Q6 for head/neck pain. Sys<160, SR. On room air. Small BM overnight. Regular diet. x2 PIV, saline locked. No gtt. Independent.   Plan: Angiogram? discharge?  For vital signs and complete assessments, please see documentation flowsheets.

## 2021-10-26 NOTE — PLAN OF CARE
Major Shift Events:  Neuro assessment WDL. Headache improved following ibuprofen. Denies nausea. On room air. Tolerating regular diet. Voiding spontaneously. Up ad gabe. Discharge teaching complete with Pt. Declines any additional questions.     Plan: Pt ambulated off of unit in stable condition with brother Blair. Declined staff escort.     For vital signs and complete assessments, please see documentation flowsheets.
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
## 2021-10-27 ENCOUNTER — TELEPHONE (OUTPATIENT)
Dept: NEUROLOGY | Facility: CLINIC | Age: 56
End: 2021-10-27

## 2021-10-27 DIAGNOSIS — I60.9 SAH (SUBARACHNOID HEMORRHAGE) (H): Primary | ICD-10-CM

## 2021-10-27 NOTE — TELEPHONE ENCOUNTER
----- Message from Marisa Colbert MD sent at 10/26/2021 10:42 AM CDT -----  Regarding: Follow up SAH  Hello,    This patient had a SAH with no aneurysm identified. Could he be scheduled for a follow up angiogram with Dr. Paulson in about 1 week? Patient was seen by Dr. Borges as inpatient.     Thank you,  Marisa Colbert MD

## 2021-10-28 ENCOUNTER — TELEPHONE (OUTPATIENT)
Dept: RADIOLOGY | Facility: CLINIC | Age: 56
End: 2021-10-28

## 2021-10-28 DIAGNOSIS — Z11.59 ENCOUNTER FOR SCREENING FOR OTHER VIRAL DISEASES: ICD-10-CM

## 2021-10-28 NOTE — TELEPHONE ENCOUNTER
Unruly is scheduled for his angiogram on 11/5/21 with Dr. Rutledge at [facility]. His procedure time is 8:00 a.m. with a 6:30 a.m. arrival.    Unruly will have his COVID test and blood work done at [facility] on 11/3/21.    Thank you,    Gali HERNANDEZ  Perioperative

## 2021-10-29 ENCOUNTER — PATIENT OUTREACH (OUTPATIENT)
Dept: NEUROLOGY | Facility: CLINIC | Age: 56
End: 2021-10-29

## 2021-10-29 NOTE — PATIENT INSTRUCTIONS
You are scheduled for a Diagnostic Cerebral Angiogram with Dr. Park on 11/5/21 at 8:00 AM.     Please follow these instructions:    * Prior to your procedure you will need to obtain COVID-19 testing within 2-4 days of your scheduled procedure. You are scheduled for 11/3/21 at 3:45 PM at [facility], located at [id number] [place] [phone]. Their contact number is [phone]. You will also need to obtain blood work at time of COVID testing. You are scheduled for 11/3/21 at 3:30 PM at [facility].    * You should arrive at the [facility] waiting room at the [facility] at 6:30 AM. The address is [address] [place] [address]. The phone number is [phone]. A map is enclosed.    * Do not eat after Midnight; you may drink clear liquids (includes water, Jell-O, clear broth, apple juice or any non-carbonated beverage that you can see through) until 6:00 AM.     * You may take your medications with a sip of water the morning of the procedure.     * You will be discharged the same day. You must have a  home and someone that can stay with you through the night.     PLEASE NOTE our COVID-19 visitors policy: For the protection of our patients and visitors, patients are allowed one consistent visitor, 18 years of age or older, per adult patient in the hospital. Visitors must wear a mask at all times while in the hospital.     All discharge instructions will be given to the  or volunteer. Documentation for the post-operative plan will be given to the patient and . Patients are required to have someone to stay with them for 24 hours after their procedure.    If you have questions regarding your procedure, please contact me at [phone], option 3.    If you need to cancel, reschedule or have procedure scheduling related questions, please call Sary at [phone].    Thank you,  Yovani Izquierdo RN,  CNRN  Stroke & Endovascular Care Coordinator

## 2021-10-29 NOTE — PROGRESS NOTES
Informed patient of procedure instructions. Confirmed date and time. Patient verbalized understanding. All questions answered. Map and patient instructions mailed to patient. Patient has my contact information and was encouraged to call with questions/concerns.     States he is doing well s/p SAH.  Occasional headaches. No neck pain. Low back and hips sore. Ibuprofen with relief. Has appointment with primary care provider on 11/2/21. [facility]

## 2021-11-01 RX ORDER — HEPARIN SODIUM 200 [USP'U]/100ML
1 INJECTION, SOLUTION INTRAVENOUS CONTINUOUS PRN
Status: CANCELLED | OUTPATIENT
Start: 2021-11-01

## 2021-11-03 ENCOUNTER — LAB (OUTPATIENT)
Dept: LAB | Facility: CLINIC | Age: 56
End: 2021-11-03
Payer: COMMERCIAL

## 2021-11-03 ENCOUNTER — TELEPHONE (OUTPATIENT)
Dept: NEUROSURGERY | Facility: CLINIC | Age: 56
End: 2021-11-03

## 2021-11-03 DIAGNOSIS — I60.9 SAH (SUBARACHNOID HEMORRHAGE) (H): ICD-10-CM

## 2021-11-03 DIAGNOSIS — Z11.59 ENCOUNTER FOR SCREENING FOR OTHER VIRAL DISEASES: ICD-10-CM

## 2021-11-03 LAB
ANION GAP SERPL CALCULATED.3IONS-SCNC: 1 MMOL/L (ref 3–14)
APTT PPP: 28 SECONDS (ref 22–38)
BUN SERPL-MCNC: 15 MG/DL (ref 7–30)
CALCIUM SERPL-MCNC: 9.7 MG/DL (ref 8.5–10.1)
CHLORIDE BLD-SCNC: 102 MMOL/L (ref 94–109)
CO2 SERPL-SCNC: 32 MMOL/L (ref 20–32)
CREAT SERPL-MCNC: 1.11 MG/DL (ref 0.66–1.25)
ERYTHROCYTE [DISTWIDTH] IN BLOOD BY AUTOMATED COUNT: 12.2 % (ref 10–15)
GFR SERPL CREATININE-BSD FRML MDRD: 73 ML/MIN/1.73M2
GLUCOSE BLD-MCNC: 112 MG/DL (ref 70–99)
HCT VFR BLD AUTO: 40 % (ref 40–53)
HGB BLD-MCNC: 13.6 G/DL (ref 13.3–17.7)
INR PPP: 1.04 (ref 0.85–1.15)
MCH RBC QN AUTO: 31.3 PG (ref 26.5–33)
MCHC RBC AUTO-ENTMCNC: 34 G/DL (ref 31.5–36.5)
MCV RBC AUTO: 92 FL (ref 78–100)
PLATELET # BLD AUTO: 422 10E3/UL (ref 150–450)
POTASSIUM BLD-SCNC: 4.8 MMOL/L (ref 3.4–5.3)
RBC # BLD AUTO: 4.35 10E6/UL (ref 4.4–5.9)
SODIUM SERPL-SCNC: 135 MMOL/L (ref 133–144)
WBC # BLD AUTO: 9.4 10E3/UL (ref 4–11)

## 2021-11-03 PROCEDURE — 85730 THROMBOPLASTIN TIME PARTIAL: CPT

## 2021-11-03 PROCEDURE — U0003 INFECTIOUS AGENT DETECTION BY NUCLEIC ACID (DNA OR RNA); SEVERE ACUTE RESPIRATORY SYNDROME CORONAVIRUS 2 (SARS-COV-2) (CORONAVIRUS DISEASE [COVID-19]), AMPLIFIED PROBE TECHNIQUE, MAKING USE OF HIGH THROUGHPUT TECHNOLOGIES AS DESCRIBED BY CMS-2020-01-R: HCPCS

## 2021-11-03 PROCEDURE — 85027 COMPLETE CBC AUTOMATED: CPT

## 2021-11-03 PROCEDURE — U0005 INFEC AGEN DETEC AMPLI PROBE: HCPCS

## 2021-11-03 PROCEDURE — 85610 PROTHROMBIN TIME: CPT

## 2021-11-03 PROCEDURE — 80048 BASIC METABOLIC PNL TOTAL CA: CPT

## 2021-11-03 PROCEDURE — 36415 COLL VENOUS BLD VENIPUNCTURE: CPT

## 2021-11-03 NOTE — TELEPHONE ENCOUNTER
[facility] Call Center    Phone Message    May a detailed message be left on voicemail: yes     Reason for Call: Other: Patients PCP calling from [facility] and she is questioning that the patient was told in the most recent hospital visit to take an amount of Advil that she does not believe is correct/safe because patient has a bleed and also has an upcoming angiogram. Provider seeking to have this clarified with Neurosurgery. Informed her that Dr. Borges is the Neurosurgeon whom saw patient in hospital. Dr. Park is who patients scheduled with for angiogram on Friday, 11/5. Please call ASAP to discuss.  Routing as high priority due to procedure on Friday.  Please note: sometimes providers cell phone doesn't work when in room with patients. If no answer, please leave a message with a more direct call back number for her.    Action Taken: Message routed to:  [facility] ([facility] NEUROSURGERY    Travel Screening: Not Applicable

## 2021-11-03 NOTE — TELEPHONE ENCOUNTER
Per Dr. Biswas on 10/26/21 Discharge Summary: Ibuprofen PRN pain control. No more than 1-2 800mg doses per day.    Ibuprofen ok for angiogram.     Dr. Kiser informed, verbalized understanding, and will ensure patient is aware that he should not be taking more than 1600 mg ibuprofen daily. Elva Izquierdo RN 11/3/2021 2:38 PM

## 2021-11-04 LAB — SARS-COV-2 RNA RESP QL NAA+PROBE: NEGATIVE

## 2021-11-05 ENCOUNTER — APPOINTMENT (OUTPATIENT)
Dept: MEDSURG UNIT | Facility: CLINIC | Age: 56
End: 2021-11-05
Attending: RADIOLOGY
Payer: COMMERCIAL

## 2021-11-05 ENCOUNTER — APPOINTMENT (OUTPATIENT)
Dept: INTERVENTIONAL RADIOLOGY/VASCULAR | Facility: CLINIC | Age: 56
End: 2021-11-05
Attending: RADIOLOGY
Payer: COMMERCIAL

## 2021-11-05 ENCOUNTER — HOSPITAL ENCOUNTER (OUTPATIENT)
Facility: CLINIC | Age: 56
Discharge: HOME | End: 2021-11-05
Attending: RADIOLOGY | Admitting: RADIOLOGY
Payer: COMMERCIAL

## 2021-11-05 VITALS
BODY MASS INDEX: 23.38 KG/M2 | HEART RATE: 72 BPM | DIASTOLIC BLOOD PRESSURE: 73 MMHG | RESPIRATION RATE: 16 BRPM | HEIGHT: 71 IN | OXYGEN SATURATION: 97 % | TEMPERATURE: 98.5 F | WEIGHT: 167 LBS | SYSTOLIC BLOOD PRESSURE: 119 MMHG

## 2021-11-05 DIAGNOSIS — I60.9 SAH (SUBARACHNOID HEMORRHAGE) (H): ICD-10-CM

## 2021-11-05 PROCEDURE — 36224 PLACE CATH CAROTD ART: CPT | Mod: 50

## 2021-11-05 PROCEDURE — 250N000011 HC RX IP 250 OP 636: Performed by: STUDENT IN AN ORGANIZED HEALTH CARE EDUCATION/TRAINING PROGRAM

## 2021-11-05 PROCEDURE — 99152 MOD SED SAME PHYS/QHP 5/>YRS: CPT | Mod: GC | Performed by: RADIOLOGY

## 2021-11-05 PROCEDURE — 272N000506 HC NEEDLE CR6

## 2021-11-05 PROCEDURE — 255N000002 HC RX 255 OP 636: Performed by: RADIOLOGY

## 2021-11-05 PROCEDURE — 272N000566 HC SHEATH CR3

## 2021-11-05 PROCEDURE — 99153 MOD SED SAME PHYS/QHP EA: CPT

## 2021-11-05 PROCEDURE — C1760 CLOSURE DEV, VASC: HCPCS

## 2021-11-05 PROCEDURE — 36224 PLACE CATH CAROTD ART: CPT | Mod: 50 | Performed by: RADIOLOGY

## 2021-11-05 PROCEDURE — 999N000132 HC STATISTIC PP CARE STAGE 1

## 2021-11-05 PROCEDURE — C1769 GUIDE WIRE: HCPCS

## 2021-11-05 PROCEDURE — C1887 CATHETER, GUIDING: HCPCS

## 2021-11-05 PROCEDURE — 36226 PLACE CATH VERTEBRAL ART: CPT | Mod: 50 | Performed by: RADIOLOGY

## 2021-11-05 PROCEDURE — 99152 MOD SED SAME PHYS/QHP 5/>YRS: CPT

## 2021-11-05 PROCEDURE — 999N000142 HC STATISTIC PROCEDURE PREP ONLY

## 2021-11-05 PROCEDURE — 258N000003 HC RX IP 258 OP 636: Performed by: STUDENT IN AN ORGANIZED HEALTH CARE EDUCATION/TRAINING PROGRAM

## 2021-11-05 PROCEDURE — 36226 PLACE CATH VERTEBRAL ART: CPT | Mod: 50

## 2021-11-05 RX ORDER — NALOXONE HYDROCHLORIDE 0.4 MG/ML
0.4 INJECTION, SOLUTION INTRAMUSCULAR; INTRAVENOUS; SUBCUTANEOUS
Status: DISCONTINUED | OUTPATIENT
Start: 2021-11-05 | End: 2021-11-05 | Stop reason: HOSPADM

## 2021-11-05 RX ORDER — HEPARIN SODIUM 1000 [USP'U]/ML
500-6000 INJECTION, SOLUTION INTRAVENOUS; SUBCUTANEOUS
Status: COMPLETED | OUTPATIENT
Start: 2021-11-05 | End: 2021-11-05

## 2021-11-05 RX ORDER — IODIXANOL 320 MG/ML
150 INJECTION, SOLUTION INTRAVASCULAR ONCE
Status: COMPLETED | OUTPATIENT
Start: 2021-11-05 | End: 2021-11-05

## 2021-11-05 RX ORDER — NALOXONE HYDROCHLORIDE 0.4 MG/ML
0.2 INJECTION, SOLUTION INTRAMUSCULAR; INTRAVENOUS; SUBCUTANEOUS
Status: DISCONTINUED | OUTPATIENT
Start: 2021-11-05 | End: 2021-11-05 | Stop reason: HOSPADM

## 2021-11-05 RX ORDER — FLUMAZENIL 0.1 MG/ML
0.2 INJECTION, SOLUTION INTRAVENOUS
Status: DISCONTINUED | OUTPATIENT
Start: 2021-11-05 | End: 2021-11-05 | Stop reason: HOSPADM

## 2021-11-05 RX ORDER — FENTANYL CITRATE 50 UG/ML
25-50 INJECTION, SOLUTION INTRAMUSCULAR; INTRAVENOUS EVERY 5 MIN PRN
Status: DISCONTINUED | OUTPATIENT
Start: 2021-11-05 | End: 2021-11-05 | Stop reason: HOSPADM

## 2021-11-05 RX ORDER — LIDOCAINE 40 MG/G
CREAM TOPICAL
Status: DISCONTINUED | OUTPATIENT
Start: 2021-11-05 | End: 2021-11-05 | Stop reason: HOSPADM

## 2021-11-05 RX ORDER — SODIUM CHLORIDE 9 MG/ML
INJECTION, SOLUTION INTRAVENOUS CONTINUOUS
Status: DISCONTINUED | OUTPATIENT
Start: 2021-11-05 | End: 2021-11-05 | Stop reason: HOSPADM

## 2021-11-05 RX ORDER — HEPARIN SODIUM 200 [USP'U]/100ML
1 INJECTION, SOLUTION INTRAVENOUS CONTINUOUS PRN
Status: DISCONTINUED | OUTPATIENT
Start: 2021-11-05 | End: 2021-11-05 | Stop reason: HOSPADM

## 2021-11-05 RX ADMIN — MIDAZOLAM 2 MG: 1 INJECTION INTRAMUSCULAR; INTRAVENOUS at 09:35

## 2021-11-05 RX ADMIN — HEPARIN SODIUM 2000 UNITS: 1000 INJECTION INTRAVENOUS; SUBCUTANEOUS at 09:35

## 2021-11-05 RX ADMIN — IODIXANOL 55 ML: 320 INJECTION, SOLUTION INTRAVASCULAR at 09:42

## 2021-11-05 RX ADMIN — FENTANYL CITRATE 100 MCG: 50 INJECTION, SOLUTION INTRAMUSCULAR; INTRAVENOUS at 09:35

## 2021-11-05 RX ADMIN — SODIUM CHLORIDE: 9 INJECTION, SOLUTION INTRAVENOUS at 07:23

## 2021-11-05 ASSESSMENT — VISUAL ACUITY
OU: NORMAL ACUITY

## 2021-11-05 ASSESSMENT — MIFFLIN-ST. JEOR: SCORE: 1599.64

## 2021-11-05 NOTE — PROGRESS NOTES
Patient Name: Unruly Akhtar  Medical Record Number: [id number]  Today's Date: 11/5/2021    Procedure: Diagnostic cerebral angiogram.  Proceduralist: MD Ezekiel., MD Vivian.  Pathology present: n/a    Procedure Start: 0828  Procedure end: 0950  Sedation medications administered: Fentanyl: 100 mcg Versed:2mg     Heparin : 2000u @0844    Report given to: 2A, RN  : n/a    Other Notes: Pt arrived to IR room 3 from . Consent reviewed. Pt denies any questions or concerns regarding procedure. Pt positioned supine and monitored per protocol. Exoseal closure device deployed @ 0930 and failed. 25 minute pressure hold.  6 hour flat bedrest until 1550. Pt tolerated procedure without any noted complications. Pt transferred back to .    Tejal Lopez RN

## 2021-11-05 NOTE — PROGRESS NOTES
[facility]     Endovascular Surgical Neuroradiology Pre-Procedure Note      HPI:  Unruly Akhtar is a 56 year old male with with no significant past medical history with a recent perimesencephalic subarachnoid hemorrhage, with an atypical pattern extending into the foramen magnum and the lateral sylvian fissure.  The patient had a diagnostic cerebral angiogram on 10/22/2021 which was unremarkable.  He now presents today for a follow-up diagnostic angiogram.    11/3/2021: INR 1.04, PTT 28, platelets 422.    Medical History:  Past Medical History:   Diagnosis Date     Uncomplicated asthma        Surgical History:  Past Surgical History:   Procedure Laterality Date     IR CAROTID CEREBRAL ANGIOGRAM BILATERAL  10/22/2021       Family History:  History reviewed. No pertinent family history.    Social History:  Social History     Socioeconomic History     Marital status: Single     Spouse name: Not on file     Number of children: Not on file     Years of education: Not on file     Highest education level: Not on file   Occupational History     Not on file   Tobacco Use     Smoking status: Never Smoker     Smokeless tobacco: Never Used   Substance and Sexual Activity     Alcohol use: Not Currently     Comment: sober since 1995     Drug use: Never     Sexual activity: Not on file   Other Topics Concern     Parent/sibling w/ CABG, MI or angioplasty before 65F 55M? Not Asked   Social History Narrative     Not on file     Social Determinants of Health     Financial Resource Strain:      Difficulty of Paying Living Expenses:    Food Insecurity:      Worried About Running Out of Food in the Last Year:      Ran Out of Food in the Last Year:    Transportation Needs:      Lack of Transportation (Medical):      Lack of Transportation (Non-Medical):    Physical Activity:      Days of Exercise per Week:      Minutes of Exercise per Session:    Stress:      Feeling of Stress :   "  Social Connections:      Frequency of Communication with Friends and Family:      Frequency of Social Gatherings with Friends and Family:      Attends Rastafarian Services:      Active Member of Clubs or Organizations:      Attends Club or Organization Meetings:      Marital Status:    Intimate Partner Violence:      Fear of Current or Ex-Partner:      Emotionally Abused:      Physically Abused:      Sexually Abused:        Allergies:  Allergies   Allergen Reactions     Ceftin [Cefuroxime] Angioedema     \"lip swelling\" per patient       Is there a contrast allergy?  No    Medications:  Medications Prior to Admission   Medication Sig Dispense Refill Last Dose     Ascorbic Acid POWD 1/2 teaspoon daily   11/4/2021 at Unknown time     cholecalciferol (VITAMIN D3) 125 mcg (5000 units) capsule Take 125 mcg by mouth daily   11/4/2021 at Unknown time     fluticasone (FLONASE) 50 MCG/ACT nasal spray Spray 1 spray into both nostrils 2 times daily    11/5/2021 at Unknown time     fluticasone-salmeterol (ADVAIR) 500-50 MCG/DOSE inhaler Inhale 1 puff into the lungs every 12 hours    11/5/2021 at Unknown time     gabapentin (NEURONTIN) 100 MG capsule Take 1 capsule (100 mg) by mouth At Bedtime 30 capsule 2 11/4/2021 at Unknown time     ibuprofen (ADVIL/MOTRIN) 800 MG tablet Take 1 tablet (800 mg) by mouth every 6 hours as needed for moderate pain 50 tablet 0 11/5/2021 at Unknown time     Methylsulfonylmethane (MSM) 1000 MG TABS Take 2 tablets by mouth daily   11/4/2021 at Unknown time     NONFORMULARY -Pure Encapsulations potassium supplement 2 tablet twice daily   -[employer] Nutrition Premium Insurance supplement 1 capsule twice daily  -[facility] Nutrition Endurolytes for workouts/cramps   11/4/2021 at Unknown time     Saw Palmetto, Serenoa repens, (SAW PALMETTO PO) Take 1 tablet by mouth daily   11/4/2021 at Unknown time     ZINC SULFATE PO Take 1 tablet by mouth daily   11/4/2021 at Unknown time     albuterol (PROAIR HFA/PROVENTIL " HFA/VENTOLIN HFA) 108 (90 Base) MCG/ACT inhaler Inhale 2 puffs into the lungs every 6 hours as needed for shortness of breath / dyspnea or wheezing   never had to use per pt   .    ROS:  The 10 point Review of Systems is negative other than noted in the HPI or here.     PHYSICAL EXAMINATION  Vital Signs:  B/P: 137/85,  T: 98.5,  P: 85,  R: 16    Middle-aged man, lying in bed in no acute distress.  He is awake, alert and oriented to person, place, time and situation.  No obvious gaze preference or neglect.  EOMI.  Face grossly symmetrical.  Tongue and uvula midline.  Palate elevates symmetrically.  Shoulder shrug and head turn normal.  Motor strength 5/5 in all 4 extremities.  Sensation grossly intact to touch bilaterally symmetrical.  No obvious dysmetria on finger-to-nose test bilaterally.  Cognitive/gait exams deferred.    LABS  (most recent Cr, BUN, GFR, PLT, INR, PTT within the past 7 days):  Recent Labs   Lab 11/03/21  1539   CR 1.11   BUN 15   GFRESTIMATED 73      INR 1.04   PTT 28        Platelet Function P2Y12 (PRU):  Not applicable      ASSESSMENT:  Proceed with diagnostic cerebral angiogram    PLAN:  Diagnostic cerebral angiogram     PRE-PROCEDURE SEDATION ASSESSMENT     Pre-Procedure Sedation Assessment done at 0730.    Expected Level:  Moderate Sedation    Indication:  Sedation is required to allow for neurointerventional procedure.    Consent obtained from patient after discussing the risks, benefits and alternatives.     PO Intake:  Appropriately NPO for procedure    ASA Class:  Class 2 - MILD SYSTEMIC DISEASE, NO ACUTE PROBLEMS, NO FUNCTIONAL LIMITATIONS.    Mallampati:  Grade 2:  Soft palate, base of uvula, tonsillar pillars, and portion of posterior pharyngeal wall visible    History and physical reviewed and no updates needed. I have reviewed the lab findings, diagnostic data, medications, and the plan for sedation. I have determined this patient to be an appropriate candidate for the  planned sedation and procedure and have reassessed the patient IMMEDIATELY PRIOR to sedation and procedure.    Patient was discussed with the Attending, Dr. Park, who agrees with the plan.    JC GRAJEDA MD   Pager: [phone]

## 2021-11-05 NOTE — PROCEDURES
[facility]     Endovascular Surgical Neuroradiology Post-Procedure Note    Pre-Procedure Diagnosis: Perimesencephalic SAH, CTA & DSA negative    Post-Procedure Diagnosis:  Same    Procedure(s):   Diagnostic cervicocerebral angiography    Findings:    [ ] No aneurysms, AVM or Dural AV fistula  [ ] Small Dissection in Left common femoral artery    Plan:    [ ] Strict bed rest x 5hrs   [ ] Discharge from clinic, no need for further follow up  [ ] Resume daily Aspirin     Primary Surgeon:  Dr. Viktor Park  Secondary Surgeon:  Not applicable  Secondary Surgeon Review:  None  Fellow:  Dr Falcon, Dr Herrera, Rojelio Santoro  Additional Assistants:     Prior to the start of the procedure and with procedural staff participation, I verbally confirmed: the patient s identity using two indicators, relevant allergies, that the procedure was appropriate and matched the consent or emergent situation, and that the correct equipment/implants were available. Immediately prior to starting the procedure I conducted the Time Out with the procedural staff and re-confirmed the patient s name, procedure, and site/side. (The Joint Commission universal protocol was followed.)  Yes    PRU value: Not applicable    Anesthesia:  Conscious Sedation  Medications:  Fentanyl 100mcg, 1% Lidocaine  Puncture site:  Left Femoral Artery    Fluoroscopy time (minutes): 23.1  Radiation dose (mGy):  822    Contrast amount (mL):  55     Estimated blood loss (mL):  20    Closure:  Manual    Disposition:  Home after recovery.        Sedation Post-Procedure Summary    Sedatives: Midazolam (Versed) 2mg    Vital signs and pulse oximetry were monitored and remained stable throughout the procedure, and sedation was maintained until the procedure was complete.  The patient was monitored by staff until sedation discharge criteria were met.    Patient tolerance:  Patient tolerated the procedure well with no immediate  complications.    Time of sedation in minutes:  45 minutes from beginning to end of physician one to one monitoring.    NAYELY Castro  Neuro IR Fellow  Pager: [phone]

## 2021-11-05 NOTE — PROGRESS NOTES
Pt has tolerated recovery and has completed prescribed bedrest. Left groin site remains CDI, soft, flat, non tender. Pt has tolerated PO. Pt is voiding spontaneously. Pt has ambulated in álvarez with steady gait, denies dizziness/lightheadedness. Discharge instructions reviewed with pt, pt verbalizes understanding. PIV d/c'd. Pt's brother going to get car. 1511 Pt transported to vehicle via wheelchair.

## 2021-11-05 NOTE — DISCHARGE INSTRUCTIONS
[facility]         Interventional Radiology  Discharge Instructions Post Angiogram (NEURO)    AFTER YOU GO HOME  ? If you were given sedation, for the first 24 hours: we recommend that an adult stay with you, DO NOT drive or operate machinery at home or at work, DO NOT smoke, DO NOT make any important or legal decisions.  ? DO NOT drink alcoholic beverages the day of your procedure  ? DO relax and take it easy for 24 hours  ? DO drink plenty of fluids  ? DO resume your regular diet, unless otherwise instructed by your Primary Physician  ? DO NOT take a shower for at least 12 hours following your procedure. No tub bath, hot tubs, or swimming for 5 days. Do NOT scrub the procedure site vigorously for 5 days.      Care of groin site  It is normal to have a small bruise or lump at the site.    For the first 2 days, when you cough, sneeze or move your bowels, hold your hand over the puncture site and press gently.    Do NOT lift more than 10 pounds or do any strenuous exercise for at least 3 to 5 days.    Do not use lotion or powder near the puncture site for 3 days.  If you start bleeding from the site in your groin: lie down flat and press firmly on the site. Call your doctor as soon as you can.   Call 911 right away if you have: Heavy bleeding, bleeding that does not stop.        CALL THE PHYSICIAN IF:  ? You start bleeding from the procedure site.  ? You have numbness, coolness or change in color of the arm or leg of the puncture site  ? You experience changes in your vision, hearing, balance, coordination, speech, thinking or memory  ? You experience weakness in one or more extremity  ? You experience pain or redness at the puncture site  ? You develop nausea or vomiting  ? You develop hives or a rash or unexplained itching  ? You develop a temperature of 101 degrees F or greater      Stroke - Call 911    Remember: BE FAST        BALANCE--Sudden loss of balance or coordination. Example: trouble  walking      EYES--Sudden problem seeing out of one or both eyes      FACE--Sudden numbness or change to one side of the face. Examples: facial droop, uneven smile      ARM--Sudden numbness or weakness in one arm or leg      SPEECH--Sudden changes in speech or talking that doesn t make sense      TIME--if the person is having any of the symptoms above, CALL 911 immediately.      Symptoms may go away, then come back.      Sudden, worst headache of your life      ADDITIONAL INSTRUCTIONS  ? Instruction booklet given: exoseal    [facility] INTERVENTIONAL RADIOLOGY DEPARTMENT  Procedure Physician:  Dr Park, Dr Herrera, Dr Falcon    Date of procedure: November 5, 2021  Telephone Numbers: [phone] Monday-Friday 8:00 am to 4:30 pm  [phone] After 4:30 pm Monday-Friday, Weekends & Holidays.   Ask for the Neuro-Interventional doctor on call.  Someone is on call 24 hrs/day  [facility] toll free number: [phone] Monday-Friday 8:00 am to 4:30 pm  [facility] Emergency Dept: [phone]

## 2021-11-05 NOTE — PROGRESS NOTES
Pt arrives to 2a, with brother, for cerebral angiogram. H&P is up to date. Consent needs to be signed.

## 2021-11-05 NOTE — PROGRESS NOTES
Pt has returned to unit 2a s/p cerebral angiogram. Left groin site CDI, soft, flat, non tender. Closure device failed, bedrest for 5 hours per Dr Herrera. Pt verbalizes understanding. Neuros intact.

## 2021-12-12 ENCOUNTER — HEALTH MAINTENANCE LETTER (OUTPATIENT)
Age: 56
End: 2021-12-12

## 2022-10-03 ENCOUNTER — HEALTH MAINTENANCE LETTER (OUTPATIENT)
Age: 57
End: 2022-10-03

## 2023-02-11 ENCOUNTER — HEALTH MAINTENANCE LETTER (OUTPATIENT)
Age: 58
End: 2023-02-11

## 2024-03-09 ENCOUNTER — HEALTH MAINTENANCE LETTER (OUTPATIENT)
Age: 59
End: 2024-03-09

## (undated) RX ORDER — HEPARIN SODIUM 200 [USP'U]/100ML
INJECTION, SOLUTION INTRAVENOUS
Status: DISPENSED
Start: 2021-11-05

## (undated) RX ORDER — LABETALOL HYDROCHLORIDE 5 MG/ML
INJECTION, SOLUTION INTRAVENOUS
Status: DISPENSED
Start: 2021-10-22

## (undated) RX ORDER — FENTANYL CITRATE 50 UG/ML
INJECTION, SOLUTION INTRAMUSCULAR; INTRAVENOUS
Status: DISPENSED
Start: 2021-11-05

## (undated) RX ORDER — LIDOCAINE HYDROCHLORIDE 10 MG/ML
INJECTION, SOLUTION EPIDURAL; INFILTRATION; INTRACAUDAL; PERINEURAL
Status: DISPENSED
Start: 2021-11-05

## (undated) RX ORDER — FENTANYL CITRATE 50 UG/ML
INJECTION, SOLUTION INTRAMUSCULAR; INTRAVENOUS
Status: DISPENSED
Start: 2021-10-22

## (undated) RX ORDER — SODIUM CHLORIDE 9 MG/ML
INJECTION, SOLUTION INTRAVENOUS
Status: DISPENSED
Start: 2021-11-05

## (undated) RX ORDER — HEPARIN SODIUM 1000 [USP'U]/ML
INJECTION, SOLUTION INTRAVENOUS; SUBCUTANEOUS
Status: DISPENSED
Start: 2021-11-05